# Patient Record
Sex: FEMALE | Race: WHITE | NOT HISPANIC OR LATINO | Employment: OTHER | URBAN - NONMETROPOLITAN AREA
[De-identification: names, ages, dates, MRNs, and addresses within clinical notes are randomized per-mention and may not be internally consistent; named-entity substitution may affect disease eponyms.]

---

## 2017-06-12 ENCOUNTER — TRANSCRIBE ORDERS (OUTPATIENT)
Dept: GENERAL RADIOLOGY | Facility: HOSPITAL | Age: 62
End: 2017-06-12

## 2017-06-12 DIAGNOSIS — Z13.9 SCREENING: ICD-10-CM

## 2017-06-12 DIAGNOSIS — M85.9 DISORDER OF BONE DENSITY AND STRUCTURE, UNSPECIFIED: Primary | ICD-10-CM

## 2017-07-17 ENCOUNTER — HOSPITAL ENCOUNTER (OUTPATIENT)
Dept: MAMMOGRAPHY | Facility: HOSPITAL | Age: 62
Discharge: HOME | End: 2017-07-17
Admitting: PHYSICIAN ASSISTANT

## 2017-07-17 ENCOUNTER — APPOINTMENT (OUTPATIENT)
Dept: BONE DENSITY | Facility: HOSPITAL | Age: 62
End: 2017-07-17

## 2017-07-17 DIAGNOSIS — M85.9 DISORDER OF BONE DENSITY AND STRUCTURE, UNSPECIFIED: ICD-10-CM

## 2017-07-17 DIAGNOSIS — Z13.9 SCREENING: ICD-10-CM

## 2017-07-17 PROCEDURE — G0202 SCR MAMMO BI INCL CAD: HCPCS

## 2017-07-17 PROCEDURE — 77063 BREAST TOMOSYNTHESIS BI: CPT

## 2017-07-17 PROCEDURE — 77080 DXA BONE DENSITY AXIAL: CPT

## 2017-07-18 ENCOUNTER — OFFICE VISIT (OUTPATIENT)
Dept: OBSTETRICS AND GYNECOLOGY | Facility: CLINIC | Age: 62
End: 2017-07-18

## 2017-07-18 VITALS
BODY MASS INDEX: 29.25 KG/M2 | DIASTOLIC BLOOD PRESSURE: 70 MMHG | WEIGHT: 182 LBS | HEIGHT: 66 IN | SYSTOLIC BLOOD PRESSURE: 132 MMHG

## 2017-07-18 DIAGNOSIS — N81.6 RECTOCELE: ICD-10-CM

## 2017-07-18 DIAGNOSIS — N95.2 POSTMENOPAUSAL ATROPHIC VAGINITIS: Primary | ICD-10-CM

## 2017-07-18 DIAGNOSIS — N81.11 MIDLINE CYSTOCELE: ICD-10-CM

## 2017-07-18 PROCEDURE — 99204 OFFICE O/P NEW MOD 45 MIN: CPT | Performed by: OBSTETRICS & GYNECOLOGY

## 2017-07-18 RX ORDER — PRAVASTATIN SODIUM 20 MG
20 TABLET ORAL NIGHTLY
Refills: 3 | COMMUNITY
Start: 2017-05-30

## 2017-07-18 RX ORDER — LEVOTHYROXINE SODIUM 0.07 MG/1
75 TABLET ORAL DAILY
Refills: 3 | COMMUNITY
Start: 2017-06-21

## 2017-07-18 RX ORDER — ALENDRONATE SODIUM 70 MG/1
TABLET ORAL
Refills: 3 | COMMUNITY
Start: 2017-06-21 | End: 2021-07-05

## 2017-07-18 RX ORDER — MINOCYCLINE HYDROCHLORIDE 50 MG/1
CAPSULE ORAL
Refills: 3 | COMMUNITY
Start: 2017-05-30 | End: 2021-07-05

## 2017-07-18 RX ORDER — CLINDAMYCIN PHOSPHATE 11.9 MG/ML
1 SOLUTION TOPICAL 2 TIMES DAILY PRN
Refills: 3 | Status: ON HOLD | COMMUNITY
Start: 2017-05-16 | End: 2021-09-02

## 2017-07-18 RX ORDER — ESTRADIOL 0.1 MG/G
2 CREAM VAGINAL DAILY
COMMUNITY
End: 2021-07-05

## 2017-07-19 NOTE — PROGRESS NOTES
Subjective  Chief Complaint   Patient presents with   • VAGINAL GROWTH     Patient advised has noticed growth inside vaginal opening since beginning of summer. Seen PCP and was started on vagnial estrogen.      Patient is 61 y.o.  here for evaluation of mass per vagina.  Pt gives history of severe cough in December.  Pt reports noticing around this time a mass at the opening of the vagina.  Pt reports having pressure and discomfort.  Pt did not palpate the mass until this summer and reports feeling a nodule at opening of vagina.  Pt reports seeing primary care.  Pt did not have examination at that time but patient given rx for topical estrogen.  Pt does report having vaginal dryness and irritation as well.  Pt reports then seeing a gyn in [place].  Pt unsure regarding findings but patient told to continue vaginal estrogen cream.  Pt reports being given samples but rx too expensive.  Pt concerned regarding cancer.  Pt has had previous hysterectomy with removal of ovaries in .  Pt had previous fibroids at that time.  Pt with no family history of gyn malignancy.  Pt had colonoscopy last year and reports normal.  Pt does have constipation but denies any splinting with defecation.  Pt does have problems with occ urinary urgency but denies any leaking of urine or hesitancy.    History  Past Medical History:   Diagnosis Date   • Anemia    • Arthritis    • H/O bone density study 2017   • H/O mammogram 2017   • History of blood transfusion    • History of Papanicolaou smear of cervix 2017    normal    • Thyroid disease      No current outpatient prescriptions on file prior to visit.     No current facility-administered medications on file prior to visit.      No Known Allergies  Past Surgical History:   Procedure Laterality Date   • BILATERAL SALPINGO OOPHORECTOMY  2007    [facility]   • BREAST BIOPSY      LEFT-BENIGN   • CARPAL TUNNEL RELEASE     •  SECTION     • COLONOSCOPY   "06/2016   • HYSTERECTOMY  06/07/2007    [facility] ([facility])     Family History   Problem Relation Age of Onset   • Diabetes Father    • Coronary artery disease Father    • Hypertension Father    • Osteoporosis Mother    • Hypertension Mother      Social History     Social History   • Marital status:      Spouse name: N/A   • Number of children: N/A   • Years of education: N/A     Social History Main Topics   • Smoking status: Never Smoker   • Smokeless tobacco: Never Used   • Alcohol use None   • Drug use: No   • Sexual activity: No     Other Topics Concern   • None     Social History Narrative     Review of Systems  All systems were reviewed and negative except for:  Gastrointestinal: postitive for  constipation  Genitourinary: postivie for  See HPI     Objective  Vitals:    07/18/17 1410   BP: 132/70   Weight: 182 lb (82.6 kg)   Height: 65.5\" (166.4 cm)     Physical Exam:  General Appearance: alert, appears stated age and cooperative  Head: normocephalic, without obvious abnormality and atraumatic  Eyes: lids and lashes normal, conjunctivae and sclerae normal, no icterus, no pallor and corneas clear  Neck: suppple, trachea midline and no thyromegaly  Lungs: clear to auscultation, respirations regular, respirations even and respirations unlabored  Heart: regular rhythm and normal rate, normal S1, S2, no murmur, gallop, or rubs and no click  Abdomen: normal bowel sounds, no masses, no hepatomegaly, no splenomegaly, soft non-tender, no guarding and no rebound tenderness  Pelvic: Clinical staff was present for exam  External genitalia:  normal appearance of the external genitalia including Bartholin's and [place]'s glands.  :  urethral meatus normal; urethral hypermobility is absent.  Vaginal:  atrophic mucosal changes are present;  Cervix:  absent.  Uterus:  absent.  Adnexa:  normal bimanual exam of the adnexa.  Cystocele GRADE 1  Rectocele GRADE 1  Extremities: moves extremities well, no edema, no cyanosis and no " redness  Skin: no bleeding, bruising or rash and no lesions noted  Psych: normal mood and affect, oriented to person, time and place, thought content organized and appropriate judgment    Lab Review   No data reviewed    Imaging   No data reviewed    Assessment/Plan    Problem List Items Addressed This Visit     None      Visit Diagnoses     Postmenopausal atrophic vaginitis    -  Primary  Patient with atrophic changes; estrace too expensive.  Rx Premarin cream given.  Instructions and precautions given.    Midline cystocele      Pt with pelvic organ prolapse.  Risks factors discussed including increasing parity, advancing age, obesity, history of hysterectomy, and chronic constipation.  Signs and symptoms discussed.  Treatment options discussed as well including expectant management, pelvic floor muscle exercises, pessary, medication, and surgery.  Changes in lifestyle discussed including no heavy lifting or straining, keep stools soft, and avoid increasing pressure in the area of prolapse.  Pelvic floor exercises were also discussed and reviewed.  Benefits and complications of wearing a pessary was discussed.  Estrogen therapy to strengthen the supporting structures and possibly ameliorate symptoms was discussed.  Surgical intervention with risks, complications, and benefits was also discussed.  Pt to return if desires any further intervention.  Patient reassured regarding findings.  No evidence of malignancy noted.    Relevant Medications    estradiol (ESTRACE) 0.1 MG/GM vaginal cream    conjugated estrogens (PREMARIN) 0.625 MG/GM vaginal cream    Rectocele      As noted above.  Pt to cont stool softners for constipation as well.          Follow up prn    This note was electronically signed.  Brissa Mello M.D.

## 2018-06-08 ENCOUNTER — TRANSCRIBE ORDERS (OUTPATIENT)
Dept: OBSTETRICS AND GYNECOLOGY | Facility: CLINIC | Age: 63
End: 2018-06-08

## 2018-06-08 DIAGNOSIS — Z12.39 SCREENING BREAST EXAMINATION: Primary | ICD-10-CM

## 2018-07-19 ENCOUNTER — HOSPITAL ENCOUNTER (OUTPATIENT)
Dept: MAMMOGRAPHY | Facility: HOSPITAL | Age: 63
Discharge: HOME | End: 2018-07-19
Attending: OBSTETRICS & GYNECOLOGY | Admitting: OBSTETRICS & GYNECOLOGY

## 2018-07-19 DIAGNOSIS — Z12.39 SCREENING BREAST EXAMINATION: ICD-10-CM

## 2018-07-19 PROCEDURE — 77063 BREAST TOMOSYNTHESIS BI: CPT

## 2018-07-19 PROCEDURE — 77067 SCR MAMMO BI INCL CAD: CPT

## 2019-06-07 ENCOUNTER — TRANSCRIBE ORDERS (OUTPATIENT)
Dept: ADMINISTRATIVE | Facility: HOSPITAL | Age: 64
End: 2019-06-07

## 2019-06-07 ENCOUNTER — TRANSCRIBE ORDERS (OUTPATIENT)
Dept: MAMMOGRAPHY | Facility: HOSPITAL | Age: 64
End: 2019-06-07

## 2019-06-07 DIAGNOSIS — Z12.39 BREAST CANCER SCREENING: Primary | ICD-10-CM

## 2019-06-07 DIAGNOSIS — M85.9 DISORDER OF BONE DENSITY AND STRUCTURE, UNSPECIFIED: Primary | ICD-10-CM

## 2019-06-10 ENCOUNTER — APPOINTMENT (OUTPATIENT)
Dept: BONE DENSITY | Facility: HOSPITAL | Age: 64
End: 2019-06-10

## 2019-06-10 DIAGNOSIS — M85.9 DISORDER OF BONE DENSITY AND STRUCTURE, UNSPECIFIED: ICD-10-CM

## 2019-06-10 PROCEDURE — 77080 DXA BONE DENSITY AXIAL: CPT

## 2019-08-02 ENCOUNTER — HOSPITAL ENCOUNTER (OUTPATIENT)
Dept: MAMMOGRAPHY | Facility: HOSPITAL | Age: 64
Discharge: HOME | End: 2019-08-02
Admitting: INTERNAL MEDICINE

## 2019-08-02 DIAGNOSIS — Z12.39 BREAST CANCER SCREENING: ICD-10-CM

## 2019-08-02 PROCEDURE — 77067 SCR MAMMO BI INCL CAD: CPT

## 2019-08-02 PROCEDURE — 77063 BREAST TOMOSYNTHESIS BI: CPT

## 2020-06-23 ENCOUNTER — TRANSCRIBE ORDERS (OUTPATIENT)
Dept: ADMINISTRATIVE | Facility: HOSPITAL | Age: 65
End: 2020-06-23

## 2020-06-23 DIAGNOSIS — Z12.31 VISIT FOR SCREENING MAMMOGRAM: Primary | ICD-10-CM

## 2020-08-03 ENCOUNTER — HOSPITAL ENCOUNTER (OUTPATIENT)
Dept: MAMMOGRAPHY | Facility: HOSPITAL | Age: 65
Discharge: HOME | End: 2020-08-03
Admitting: OBSTETRICS & GYNECOLOGY

## 2020-08-03 DIAGNOSIS — Z12.31 VISIT FOR SCREENING MAMMOGRAM: ICD-10-CM

## 2020-08-03 PROCEDURE — 77067 SCR MAMMO BI INCL CAD: CPT

## 2020-08-03 PROCEDURE — 77063 BREAST TOMOSYNTHESIS BI: CPT

## 2021-06-17 ENCOUNTER — TRANSCRIBE ORDERS (OUTPATIENT)
Dept: ADMINISTRATIVE | Facility: HOSPITAL | Age: 66
End: 2021-06-17

## 2021-06-17 DIAGNOSIS — Z12.31 VISIT FOR SCREENING MAMMOGRAM: Primary | ICD-10-CM

## 2021-07-05 ENCOUNTER — PRE-ADMISSION TESTING (OUTPATIENT)
Dept: PREADMISSION TESTING | Facility: HOSPITAL | Age: 66
End: 2021-07-05

## 2021-07-05 VITALS — HEIGHT: 65 IN | BODY MASS INDEX: 25.01 KG/M2 | WEIGHT: 150.13 LBS

## 2021-07-05 LAB
ABO GROUP BLD: NORMAL
ANION GAP SERPL CALCULATED.3IONS-SCNC: 8 MMOL/L (ref 5–15)
BLD GP AB SCN SERPL QL: NEGATIVE
BUN SERPL-MCNC: 21 MG/DL (ref 8–23)
BUN/CREAT SERPL: 30.4 (ref 7–25)
CALCIUM SPEC-SCNC: 9.6 MG/DL (ref 8.6–10.5)
CHLORIDE SERPL-SCNC: 103 MMOL/L (ref 98–107)
CO2 SERPL-SCNC: 28 MMOL/L (ref 22–29)
CREAT SERPL-MCNC: 0.69 MG/DL (ref 0.57–1)
DEPRECATED RDW RBC AUTO: 44.9 FL (ref 37–54)
ERYTHROCYTE [DISTWIDTH] IN BLOOD BY AUTOMATED COUNT: 13.2 % (ref 12.3–15.4)
GFR SERPL CREATININE-BSD FRML MDRD: 85 ML/MIN/1.73
GLUCOSE SERPL-MCNC: 85 MG/DL (ref 65–99)
HCT VFR BLD AUTO: 41.6 % (ref 34–46.6)
HGB BLD-MCNC: 13.2 G/DL (ref 12–15.9)
MCH RBC QN AUTO: 28.9 PG (ref 26.6–33)
MCHC RBC AUTO-ENTMCNC: 31.7 G/DL (ref 31.5–35.7)
MCV RBC AUTO: 91.2 FL (ref 79–97)
PLATELET # BLD AUTO: 210 10*3/MM3 (ref 140–450)
PMV BLD AUTO: 11.6 FL (ref 6–12)
POTASSIUM SERPL-SCNC: 4.5 MMOL/L (ref 3.5–5.2)
RBC # BLD AUTO: 4.56 10*6/MM3 (ref 3.77–5.28)
RH BLD: POSITIVE
SARS-COV-2 RNA PNL SPEC NAA+PROBE: NOT DETECTED
SODIUM SERPL-SCNC: 139 MMOL/L (ref 136–145)
T&S EXPIRATION DATE: NORMAL
WBC # BLD AUTO: 6.26 10*3/MM3 (ref 3.4–10.8)

## 2021-07-05 PROCEDURE — 86901 BLOOD TYPING SEROLOGIC RH(D): CPT

## 2021-07-05 PROCEDURE — 80048 BASIC METABOLIC PNL TOTAL CA: CPT

## 2021-07-05 PROCEDURE — U0004 COV-19 TEST NON-CDC HGH THRU: HCPCS

## 2021-07-05 PROCEDURE — 36415 COLL VENOUS BLD VENIPUNCTURE: CPT

## 2021-07-05 PROCEDURE — 86900 BLOOD TYPING SEROLOGIC ABO: CPT

## 2021-07-05 PROCEDURE — 93005 ELECTROCARDIOGRAM TRACING: CPT

## 2021-07-05 PROCEDURE — 85027 COMPLETE CBC AUTOMATED: CPT

## 2021-07-05 PROCEDURE — C9803 HOPD COVID-19 SPEC COLLECT: HCPCS

## 2021-07-05 PROCEDURE — 93010 ELECTROCARDIOGRAM REPORT: CPT | Performed by: INTERNAL MEDICINE

## 2021-07-05 PROCEDURE — 86850 RBC ANTIBODY SCREEN: CPT

## 2021-07-05 RX ORDER — MULTIPLE VITAMINS W/ MINERALS TAB 9MG-400MCG
1 TAB ORAL DAILY
Status: ON HOLD | COMMUNITY
End: 2021-09-02

## 2021-07-05 RX ORDER — SODIUM PHOSPHATE,MONO-DIBASIC 19G-7G/118
1 ENEMA (ML) RECTAL DAILY
Status: ON HOLD | COMMUNITY
End: 2021-07-07

## 2021-07-05 RX ORDER — MULTIPLE VITAMINS W/ MINERALS TAB 9MG-400MCG
1 TAB ORAL DAILY
COMMUNITY

## 2021-07-05 NOTE — PAT
An arrival time for procedure was not given during PAT visit. If patient had any questions or concerns about their arrival time, they were instructed to contact their surgeon/physician.  Additionally, if the patient referred to an arrival time that was acquired from their my chart account, patient was encouraged to verify that time with their surgeon/physician.  NO arrival times given in Pre Admission Testing Department.    Patient to apply Chlorhexadine wipes  to surgical area (as instructed) the night before procedure and the AM of procedure. Wipes provided.

## 2021-07-06 ENCOUNTER — ANESTHESIA EVENT (OUTPATIENT)
Dept: PERIOP | Facility: HOSPITAL | Age: 66
End: 2021-07-06

## 2021-07-06 LAB
QT INTERVAL: 394 MS
QTC INTERVAL: 394 MS

## 2021-07-06 RX ORDER — SODIUM CHLORIDE 0.9 % (FLUSH) 0.9 %
10 SYRINGE (ML) INJECTION EVERY 12 HOURS SCHEDULED
Status: CANCELLED | OUTPATIENT
Start: 2021-07-06

## 2021-07-06 RX ORDER — SODIUM CHLORIDE 0.9 % (FLUSH) 0.9 %
10 SYRINGE (ML) INJECTION AS NEEDED
Status: CANCELLED | OUTPATIENT
Start: 2021-07-06

## 2021-07-06 RX ORDER — FAMOTIDINE 10 MG/ML
20 INJECTION, SOLUTION INTRAVENOUS ONCE
Status: CANCELLED | OUTPATIENT
Start: 2021-07-06 | End: 2021-07-06

## 2021-07-07 ENCOUNTER — ANESTHESIA (OUTPATIENT)
Dept: PERIOP | Facility: HOSPITAL | Age: 66
End: 2021-07-07

## 2021-07-07 ENCOUNTER — HOSPITAL ENCOUNTER (OUTPATIENT)
Facility: HOSPITAL | Age: 66
Discharge: HOME | End: 2021-07-08
Attending: OBSTETRICS & GYNECOLOGY | Admitting: OBSTETRICS & GYNECOLOGY

## 2021-07-07 PROBLEM — N81.10 PELVIC RELAXATION DUE TO VAGINAL PROLAPSE: Status: ACTIVE | Noted: 2021-07-07

## 2021-07-07 PROCEDURE — 57425 LAPAROSCOPY SURG COLPOPEXY: CPT | Performed by: PHYSICIAN ASSISTANT

## 2021-07-07 PROCEDURE — 25010000002 ONDANSETRON PER 1 MG: Performed by: NURSE ANESTHETIST, CERTIFIED REGISTERED

## 2021-07-07 PROCEDURE — 25010000003 LIDOCAINE 1 % SOLUTION: Performed by: NURSE ANESTHETIST, CERTIFIED REGISTERED

## 2021-07-07 PROCEDURE — 25010000002 NEOSTIGMINE 10 MG/10ML SOLUTION: Performed by: NURSE ANESTHETIST, CERTIFIED REGISTERED

## 2021-07-07 PROCEDURE — 25010000002 KETOROLAC TROMETHAMINE PER 15 MG: Performed by: OBSTETRICS & GYNECOLOGY

## 2021-07-07 PROCEDURE — 25010000002 CEFAZOLIN PER 500 MG: Performed by: OBSTETRICS & GYNECOLOGY

## 2021-07-07 PROCEDURE — 25010000003 CEFAZOLIN IN DEXTROSE 2-4 GM/100ML-% SOLUTION: Performed by: OBSTETRICS & GYNECOLOGY

## 2021-07-07 PROCEDURE — C1781 MESH (IMPLANTABLE): HCPCS | Performed by: OBSTETRICS & GYNECOLOGY

## 2021-07-07 PROCEDURE — 25010000002 HEPARIN (PORCINE) PER 1000 UNITS: Performed by: OBSTETRICS & GYNECOLOGY

## 2021-07-07 PROCEDURE — 25010000002 DEXAMETHASONE PER 1 MG: Performed by: NURSE ANESTHETIST, CERTIFIED REGISTERED

## 2021-07-07 PROCEDURE — 25010000002 PROPOFOL 10 MG/ML EMULSION: Performed by: NURSE ANESTHETIST, CERTIFIED REGISTERED

## 2021-07-07 PROCEDURE — 25010000002 FENTANYL CITRATE (PF) 50 MCG/ML SOLUTION: Performed by: NURSE ANESTHETIST, CERTIFIED REGISTERED

## 2021-07-07 DEVICE — MESH ARTISYN Y SHP: Type: IMPLANTABLE DEVICE | Site: PELVIS | Status: FUNCTIONAL

## 2021-07-07 RX ORDER — MIDAZOLAM HYDROCHLORIDE 1 MG/ML
0.5 INJECTION INTRAMUSCULAR; INTRAVENOUS
Status: DISCONTINUED | OUTPATIENT
Start: 2021-07-07 | End: 2021-07-07 | Stop reason: HOSPADM

## 2021-07-07 RX ORDER — ONDANSETRON 4 MG/1
4 TABLET, FILM COATED ORAL EVERY 6 HOURS PRN
Status: DISCONTINUED | OUTPATIENT
Start: 2021-07-07 | End: 2021-07-08 | Stop reason: HOSPADM

## 2021-07-07 RX ORDER — LIDOCAINE HYDROCHLORIDE 10 MG/ML
0.5 INJECTION, SOLUTION EPIDURAL; INFILTRATION; INTRACAUDAL; PERINEURAL ONCE AS NEEDED
Status: COMPLETED | OUTPATIENT
Start: 2021-07-07 | End: 2021-07-07

## 2021-07-07 RX ORDER — POLYETHYLENE GLYCOL 3350 17 G/17G
17 POWDER, FOR SOLUTION ORAL DAILY PRN
Status: DISCONTINUED | OUTPATIENT
Start: 2021-07-07 | End: 2021-07-08 | Stop reason: HOSPADM

## 2021-07-07 RX ORDER — SODIUM CHLORIDE, SODIUM LACTATE, POTASSIUM CHLORIDE, CALCIUM CHLORIDE 600; 310; 30; 20 MG/100ML; MG/100ML; MG/100ML; MG/100ML
9 INJECTION, SOLUTION INTRAVENOUS CONTINUOUS
Status: DISCONTINUED | OUTPATIENT
Start: 2021-07-07 | End: 2021-07-08 | Stop reason: HOSPADM

## 2021-07-07 RX ORDER — KETOROLAC TROMETHAMINE 15 MG/ML
15 INJECTION, SOLUTION INTRAMUSCULAR; INTRAVENOUS EVERY 6 HOURS
Status: COMPLETED | OUTPATIENT
Start: 2021-07-07 | End: 2021-07-07

## 2021-07-07 RX ORDER — HYDROMORPHONE HYDROCHLORIDE 1 MG/ML
0.5 INJECTION, SOLUTION INTRAMUSCULAR; INTRAVENOUS; SUBCUTANEOUS
Status: DISCONTINUED | OUTPATIENT
Start: 2021-07-07 | End: 2021-07-07 | Stop reason: HOSPADM

## 2021-07-07 RX ORDER — HYDROCODONE BITARTRATE AND ACETAMINOPHEN 5; 325 MG/1; MG/1
1 TABLET ORAL EVERY 4 HOURS PRN
Status: DISCONTINUED | OUTPATIENT
Start: 2021-07-07 | End: 2021-07-08 | Stop reason: HOSPADM

## 2021-07-07 RX ORDER — LIDOCAINE HYDROCHLORIDE 10 MG/ML
INJECTION, SOLUTION INFILTRATION; PERINEURAL AS NEEDED
Status: DISCONTINUED | OUTPATIENT
Start: 2021-07-07 | End: 2021-07-07 | Stop reason: SURG

## 2021-07-07 RX ORDER — DEXAMETHASONE SODIUM PHOSPHATE 4 MG/ML
INJECTION, SOLUTION INTRA-ARTICULAR; INTRALESIONAL; INTRAMUSCULAR; INTRAVENOUS; SOFT TISSUE AS NEEDED
Status: DISCONTINUED | OUTPATIENT
Start: 2021-07-07 | End: 2021-07-07 | Stop reason: SURG

## 2021-07-07 RX ORDER — LIDOCAINE 50 MG/G
OINTMENT TOPICAL AS NEEDED
Status: DISCONTINUED | OUTPATIENT
Start: 2021-07-07 | End: 2021-07-07 | Stop reason: SURG

## 2021-07-07 RX ORDER — NEOSTIGMINE METHYLSULFATE 1 MG/ML
INJECTION, SOLUTION INTRAVENOUS AS NEEDED
Status: DISCONTINUED | OUTPATIENT
Start: 2021-07-07 | End: 2021-07-07 | Stop reason: SURG

## 2021-07-07 RX ORDER — ROCURONIUM BROMIDE 10 MG/ML
INJECTION, SOLUTION INTRAVENOUS AS NEEDED
Status: DISCONTINUED | OUTPATIENT
Start: 2021-07-07 | End: 2021-07-07 | Stop reason: SURG

## 2021-07-07 RX ORDER — ACETAMINOPHEN 650 MG/1
650 SUPPOSITORY RECTAL EVERY 4 HOURS PRN
Status: DISCONTINUED | OUTPATIENT
Start: 2021-07-07 | End: 2021-07-08 | Stop reason: HOSPADM

## 2021-07-07 RX ORDER — ONDANSETRON 2 MG/ML
4 INJECTION INTRAMUSCULAR; INTRAVENOUS EVERY 6 HOURS PRN
Status: DISCONTINUED | OUTPATIENT
Start: 2021-07-07 | End: 2021-07-08 | Stop reason: HOSPADM

## 2021-07-07 RX ORDER — CEFAZOLIN SODIUM 2 G/100ML
2 INJECTION, SOLUTION INTRAVENOUS EVERY 8 HOURS
Status: COMPLETED | OUTPATIENT
Start: 2021-07-07 | End: 2021-07-08

## 2021-07-07 RX ORDER — ACETAMINOPHEN 160 MG/5ML
650 SOLUTION ORAL EVERY 4 HOURS PRN
Status: DISCONTINUED | OUTPATIENT
Start: 2021-07-07 | End: 2021-07-08 | Stop reason: HOSPADM

## 2021-07-07 RX ORDER — ACETAMINOPHEN 325 MG/1
650 TABLET ORAL EVERY 4 HOURS PRN
Status: DISCONTINUED | OUTPATIENT
Start: 2021-07-07 | End: 2021-07-08 | Stop reason: HOSPADM

## 2021-07-07 RX ORDER — BIOTIN 1 MG
1000 TABLET ORAL DAILY
COMMUNITY

## 2021-07-07 RX ORDER — PHENAZOPYRIDINE HYDROCHLORIDE 100 MG/1
200 TABLET, FILM COATED ORAL ONCE
Status: COMPLETED | OUTPATIENT
Start: 2021-07-07 | End: 2021-07-07

## 2021-07-07 RX ORDER — LEVOTHYROXINE SODIUM 0.07 MG/1
75 TABLET ORAL
Status: DISCONTINUED | OUTPATIENT
Start: 2021-07-08 | End: 2021-07-08 | Stop reason: HOSPADM

## 2021-07-07 RX ORDER — FAMOTIDINE 20 MG/1
20 TABLET, FILM COATED ORAL ONCE
Status: COMPLETED | OUTPATIENT
Start: 2021-07-07 | End: 2021-07-07

## 2021-07-07 RX ORDER — ONDANSETRON 2 MG/ML
4 INJECTION INTRAMUSCULAR; INTRAVENOUS ONCE AS NEEDED
Status: DISCONTINUED | OUTPATIENT
Start: 2021-07-07 | End: 2021-07-07 | Stop reason: HOSPADM

## 2021-07-07 RX ORDER — GLYCOPYRROLATE 0.2 MG/ML
INJECTION INTRAMUSCULAR; INTRAVENOUS AS NEEDED
Status: DISCONTINUED | OUTPATIENT
Start: 2021-07-07 | End: 2021-07-07 | Stop reason: SURG

## 2021-07-07 RX ORDER — MORPHINE SULFATE 2 MG/ML
1 INJECTION, SOLUTION INTRAMUSCULAR; INTRAVENOUS EVERY 4 HOURS PRN
Status: DISCONTINUED | OUTPATIENT
Start: 2021-07-07 | End: 2021-07-08 | Stop reason: HOSPADM

## 2021-07-07 RX ORDER — SODIUM CHLORIDE, SODIUM LACTATE, POTASSIUM CHLORIDE, CALCIUM CHLORIDE 600; 310; 30; 20 MG/100ML; MG/100ML; MG/100ML; MG/100ML
100 INJECTION, SOLUTION INTRAVENOUS CONTINUOUS
Status: DISCONTINUED | OUTPATIENT
Start: 2021-07-07 | End: 2021-07-08 | Stop reason: HOSPADM

## 2021-07-07 RX ORDER — PROMETHAZINE HYDROCHLORIDE 25 MG/1
25 TABLET ORAL ONCE AS NEEDED
Status: DISCONTINUED | OUTPATIENT
Start: 2021-07-07 | End: 2021-07-07 | Stop reason: HOSPADM

## 2021-07-07 RX ORDER — HEPARIN SODIUM 5000 [USP'U]/ML
5000 INJECTION, SOLUTION INTRAVENOUS; SUBCUTANEOUS ONCE
Status: COMPLETED | OUTPATIENT
Start: 2021-07-07 | End: 2021-07-07

## 2021-07-07 RX ORDER — CEFAZOLIN SODIUM 2 G/100ML
2 INJECTION, SOLUTION INTRAVENOUS ONCE
Status: COMPLETED | OUTPATIENT
Start: 2021-07-07 | End: 2021-07-07

## 2021-07-07 RX ORDER — EPHEDRINE SULFATE 50 MG/ML
INJECTION, SOLUTION INTRAVENOUS AS NEEDED
Status: DISCONTINUED | OUTPATIENT
Start: 2021-07-07 | End: 2021-07-07 | Stop reason: SURG

## 2021-07-07 RX ORDER — NALOXONE HCL 0.4 MG/ML
0.4 VIAL (ML) INJECTION
Status: DISCONTINUED | OUTPATIENT
Start: 2021-07-07 | End: 2021-07-08 | Stop reason: HOSPADM

## 2021-07-07 RX ORDER — PRAVASTATIN SODIUM 20 MG
20 TABLET ORAL NIGHTLY
Status: DISCONTINUED | OUTPATIENT
Start: 2021-07-07 | End: 2021-07-08 | Stop reason: HOSPADM

## 2021-07-07 RX ORDER — FENTANYL CITRATE 50 UG/ML
50 INJECTION, SOLUTION INTRAMUSCULAR; INTRAVENOUS
Status: DISCONTINUED | OUTPATIENT
Start: 2021-07-07 | End: 2021-07-07 | Stop reason: HOSPADM

## 2021-07-07 RX ORDER — BUPIVACAINE HYDROCHLORIDE 5 MG/ML
INJECTION, SOLUTION PERINEURAL AS NEEDED
Status: DISCONTINUED | OUTPATIENT
Start: 2021-07-07 | End: 2021-07-07 | Stop reason: HOSPADM

## 2021-07-07 RX ORDER — ACETAMINOPHEN 500 MG
1000 TABLET ORAL ONCE
Status: COMPLETED | OUTPATIENT
Start: 2021-07-07 | End: 2021-07-07

## 2021-07-07 RX ORDER — HEPARIN SODIUM 5000 [USP'U]/ML
5000 INJECTION, SOLUTION INTRAVENOUS; SUBCUTANEOUS EVERY 12 HOURS SCHEDULED
Status: DISCONTINUED | OUTPATIENT
Start: 2021-07-08 | End: 2021-07-08 | Stop reason: HOSPADM

## 2021-07-07 RX ORDER — PROPOFOL 10 MG/ML
VIAL (ML) INTRAVENOUS AS NEEDED
Status: DISCONTINUED | OUTPATIENT
Start: 2021-07-07 | End: 2021-07-07 | Stop reason: SURG

## 2021-07-07 RX ORDER — SIMETHICONE 80 MG
80 TABLET,CHEWABLE ORAL 4 TIMES DAILY PRN
Status: DISCONTINUED | OUTPATIENT
Start: 2021-07-07 | End: 2021-07-08 | Stop reason: HOSPADM

## 2021-07-07 RX ORDER — MAGNESIUM HYDROXIDE 1200 MG/15ML
LIQUID ORAL AS NEEDED
Status: DISCONTINUED | OUTPATIENT
Start: 2021-07-07 | End: 2021-07-07 | Stop reason: HOSPADM

## 2021-07-07 RX ORDER — FENTANYL CITRATE 50 UG/ML
INJECTION, SOLUTION INTRAMUSCULAR; INTRAVENOUS AS NEEDED
Status: DISCONTINUED | OUTPATIENT
Start: 2021-07-07 | End: 2021-07-07 | Stop reason: SURG

## 2021-07-07 RX ORDER — ONDANSETRON 2 MG/ML
INJECTION INTRAMUSCULAR; INTRAVENOUS AS NEEDED
Status: DISCONTINUED | OUTPATIENT
Start: 2021-07-07 | End: 2021-07-07 | Stop reason: SURG

## 2021-07-07 RX ORDER — PROMETHAZINE HYDROCHLORIDE 25 MG/1
25 SUPPOSITORY RECTAL ONCE AS NEEDED
Status: DISCONTINUED | OUTPATIENT
Start: 2021-07-07 | End: 2021-07-07 | Stop reason: HOSPADM

## 2021-07-07 RX ORDER — SODIUM CHLORIDE 9 MG/ML
INJECTION, SOLUTION INTRAVENOUS AS NEEDED
Status: DISCONTINUED | OUTPATIENT
Start: 2021-07-07 | End: 2021-07-07 | Stop reason: HOSPADM

## 2021-07-07 RX ADMIN — SODIUM CHLORIDE, POTASSIUM CHLORIDE, SODIUM LACTATE AND CALCIUM CHLORIDE 9 ML/HR: 600; 310; 30; 20 INJECTION, SOLUTION INTRAVENOUS at 06:09

## 2021-07-07 RX ADMIN — LIDOCAINE HYDROCHLORIDE 0.5 ML: 10 INJECTION, SOLUTION EPIDURAL; INFILTRATION; INTRACAUDAL; PERINEURAL at 06:09

## 2021-07-07 RX ADMIN — ACETAMINOPHEN 1000 MG: 500 TABLET, FILM COATED ORAL at 06:28

## 2021-07-07 RX ADMIN — ONDANSETRON 4 MG: 2 INJECTION INTRAMUSCULAR; INTRAVENOUS at 09:57

## 2021-07-07 RX ADMIN — CEFAZOLIN 2 G: 10 INJECTION, POWDER, FOR SOLUTION INTRAVENOUS at 23:25

## 2021-07-07 RX ADMIN — ROCURONIUM BROMIDE 10 MG: 10 INJECTION, SOLUTION INTRAVENOUS at 09:18

## 2021-07-07 RX ADMIN — KETOROLAC TROMETHAMINE 15 MG: 15 INJECTION, SOLUTION INTRAMUSCULAR; INTRAVENOUS at 18:13

## 2021-07-07 RX ADMIN — PROPOFOL 25 MCG/KG/MIN: 10 INJECTION, EMULSION INTRAVENOUS at 07:45

## 2021-07-07 RX ADMIN — GLYCOPYRROLATE 0.1 MG: 0.4 INJECTION INTRAMUSCULAR; INTRAVENOUS at 07:58

## 2021-07-07 RX ADMIN — CEFAZOLIN 2 G: 10 INJECTION, POWDER, FOR SOLUTION INTRAVENOUS at 15:53

## 2021-07-07 RX ADMIN — LIDOCAINE HYDROCHLORIDE 50 MG: 10 INJECTION, SOLUTION INFILTRATION; PERINEURAL at 07:37

## 2021-07-07 RX ADMIN — ROCURONIUM BROMIDE 40 MG: 10 INJECTION, SOLUTION INTRAVENOUS at 07:37

## 2021-07-07 RX ADMIN — NEOSTIGMINE 3 MG: 1 INJECTION INTRAVENOUS at 10:11

## 2021-07-07 RX ADMIN — EPHEDRINE SULFATE 5 MG: 50 INJECTION INTRAVENOUS at 07:52

## 2021-07-07 RX ADMIN — HYDROCODONE BITARTRATE AND ACETAMINOPHEN 1 TABLET: 5; 325 TABLET ORAL at 12:53

## 2021-07-07 RX ADMIN — PROPOFOL 150 MG: 10 INJECTION, EMULSION INTRAVENOUS at 07:37

## 2021-07-07 RX ADMIN — SODIUM CHLORIDE, POTASSIUM CHLORIDE, SODIUM LACTATE AND CALCIUM CHLORIDE: 600; 310; 30; 20 INJECTION, SOLUTION INTRAVENOUS at 09:53

## 2021-07-07 RX ADMIN — PHENAZOPYRIDINE 200 MG: 100 TABLET ORAL at 06:28

## 2021-07-07 RX ADMIN — PRAVASTATIN SODIUM 20 MG: 20 TABLET ORAL at 20:47

## 2021-07-07 RX ADMIN — SODIUM CHLORIDE, POTASSIUM CHLORIDE, SODIUM LACTATE AND CALCIUM CHLORIDE 100 ML/HR: 600; 310; 30; 20 INJECTION, SOLUTION INTRAVENOUS at 12:32

## 2021-07-07 RX ADMIN — KETOROLAC TROMETHAMINE 15 MG: 15 INJECTION, SOLUTION INTRAMUSCULAR; INTRAVENOUS at 12:52

## 2021-07-07 RX ADMIN — FENTANYL CITRATE 50 MCG: 50 INJECTION, SOLUTION INTRAMUSCULAR; INTRAVENOUS at 07:32

## 2021-07-07 RX ADMIN — DEXAMETHASONE SODIUM PHOSPHATE 8 MG: 4 INJECTION, SOLUTION INTRA-ARTICULAR; INTRALESIONAL; INTRAMUSCULAR; INTRAVENOUS; SOFT TISSUE at 07:53

## 2021-07-07 RX ADMIN — FENTANYL CITRATE 50 MCG: 50 INJECTION, SOLUTION INTRAMUSCULAR; INTRAVENOUS at 10:13

## 2021-07-07 RX ADMIN — SERTRALINE HYDROCHLORIDE 50 MG: 50 TABLET ORAL at 12:53

## 2021-07-07 RX ADMIN — LIDOCAINE 1 APPLICATION: 50 OINTMENT TOPICAL at 07:39

## 2021-07-07 RX ADMIN — FAMOTIDINE 20 MG: 20 TABLET ORAL at 06:28

## 2021-07-07 RX ADMIN — GLYCOPYRROLATE 0.4 MG: 0.4 INJECTION INTRAMUSCULAR; INTRAVENOUS at 10:11

## 2021-07-07 RX ADMIN — CEFAZOLIN SODIUM 2 G: 10 INJECTION, POWDER, FOR SOLUTION INTRAVENOUS at 07:31

## 2021-07-07 NOTE — PLAN OF CARE
Goal Outcome Evaluation:           Progress: improving     VSS. Saldana in place which is to d/c at 0600 tomorrow. Pain controlled with PO medication. Tolerating PO intake without c/o n/v. Scuds on. Lap sites dry, intact, with dried drainage.

## 2021-07-07 NOTE — H&P
Patient Care Team:  Ashley Grier MD as PCP - General (Internal Medicine)  Brissa Bowers MD as Consulting Physician (Obstetrics and Gynecology)  BeBatool torrez MD as Consulting Physician (Obstetrics and Gynecology)    Chief complaint  Vaginal prolapse.  Now falling out around the pessary    Subjective     Patient is a 65 y.o. female presents with complete vaginal prolapse.  Was using a pessary for a long time but then now is having the vagina fall out around the pessary. Very uncomfortable.  Desires surgical repair.     Review of Systems   Pertinent items are noted in HPI    History  Past Medical History:   Diagnosis Date   • Anemia    • Anxiety and depression    • Arthritis    • History of blood transfusion     AT BIRTH   • Hyperlipidemia    • Hypertension    • Murmur    • Skin cancer     SQUAMOUS CELL-NECK   • Thyroid disease      Past Surgical History:   Procedure Laterality Date   • BILATERAL SALPINGO OOPHORECTOMY  2007    [facility]   • BREAST BIOPSY      bilateral, benign   • CARPAL TUNNEL RELEASE Left    •  SECTION     • CLOSED REDUCTION ANKLE FRACTURE Left    • COLONOSCOPY  2016   • HYSTERECTOMY  2007    [facility] ([facility])     Family History   Problem Relation Age of Onset   • Diabetes Father    • Coronary artery disease Father    • Hypertension Father    • Osteoporosis Mother    • Hypertension Mother      Social History     Tobacco Use   • Smoking status: Former Smoker   • Smokeless tobacco: Never Used   • Tobacco comment: Quit in    Vaping Use   • Vaping Use: Never used   Substance Use Topics   • Alcohol use: Never   • Drug use: No     E-cigarette/Vaping   • E-cigarette/Vaping Use Never User    • Passive Exposure No      E-cigarette/Vaping Substances     Medications Prior to Admission   Medication Sig Dispense Refill Last Dose   • Biotin 1000 MCG tablet Take 1,000 mcg by mouth Daily.   2021 at 1000   • Calcium Carbonate-Vitamin D3 (Calcium 600-D) 600-400  MG-UNIT tablet Take 1 tablet by mouth Daily.   7/6/2021 at Unknown time   • Glucosamine-Chondroit-Vit C-Mn (GLUCOSAMINE CHONDR 1500 COMPLX PO) Take 1 tablet by mouth Daily.   7/6/2021 at 1000   • levothyroxine (SYNTHROID, LEVOTHROID) 75 MCG tablet Take 75 mcg by mouth Daily.  3 7/6/2021 at 1000   • multivitamin with minerals (Hair/Skin/Nails/Biotin) tablet tablet Take 1 tablet by mouth Daily.   7/6/2021 at 1000   • multivitamin with minerals (Multivitamin Adult) tablet tablet Take 1 tablet by mouth Daily.   7/6/2021 at 1000   • nystatin-triamcinolone (MYCOLOG II) 402108-1.1 UNIT/GM-% cream Apply 1 application topically to the appropriate area as directed Daily As Needed.   7/6/2021 at 1000   • pravastatin (PRAVACHOL) 20 MG tablet Take 20 mg by mouth Every Night.  3 7/6/2021 at 2300   • sertraline (ZOLOFT) 50 MG tablet Take 50 mg by mouth Daily.  3 7/6/2021 at 1000   • clindamycin (CLEOCIN T) 1 % external solution Apply 1 application topically to the appropriate area as directed 2 (Two) Times a Day As Needed.  3 6/30/2021   • conjugated estrogens (PREMARIN) 0.625 MG/GM vaginal cream Use 0.5 - 2.0 grams intravaginally 1-3 times per week to control symptoms. (Patient taking differently: Insert  into the vagina Daily. Use 0.5 - 2.0 grams intravaginally 1-3 times per week to control symptoms.) 1 each 12 7/4/2021     Allergies:  No Known Allergies    Objective     Vital Signs  Temp:  [97.8 °F (36.6 °C)] 97.8 °F (36.6 °C)  Heart Rate:  [64] 64  Resp:  [16] 16  BP: (149)/(81) 149/81    Physical Exam:      General Appearance:    Alert, cooperative, in no acute distress   Head:    Normocephalic, without obvious abnormality, atraumatic   Eyes:            Lids and lashes normal, conjunctivae and sclerae normal, no   icterus, no pallor, corneas clear, PERRLA   Ears:    Ears appear intact with no abnormalities noted   Throat:   No oral lesions, no thrush, oral mucosa moist   Neck:   No adenopathy, supple, trachea midline, no  thyromegaly, no     carotid bruit, no JVD   Back:     No kyphosis present, no scoliosis present, no skin lesions,       erythema or scars, no tenderness to percussion or                   palpation,   range of motion normal   Lungs:     Clear to auscultation,respirations regular, even and                   unlabored    Heart:    Regular rhythm and normal rate, normal S1 and S2, no            murmur, no gallop, no rub, no click   Breast Exam:    Deferred   Abdomen:     Normal bowel sounds, no masses, no organomegaly, soft        non-tender, non-distended, no guarding, no rebound                 tenderness   Genitalia:    Deferred   Extremities:   Moves all extremities well, no edema, no cyanosis, no              redness   Pulses:   Pulses palpable and equal bilaterally   Skin:   No bleeding, bruising or rash   Lymph nodes:   No palpable adenopathy   Neurologic:   Cranial nerves 2 - 12 grossly intact, sensation intact, DTR        present and equal bilaterally       Results Review:    I reviewed the patient's new clinical results.    Assessment/Plan       * No active hospital problems. *      Vaginal prolpase. Prior hysterectomy and BSO .  Long term pessary use but now prolapse is returning even around the pessary.  Pt desires surgical repair.  R/b/a to surgery given, postop findings and recovery expectations discussed. Pt has all her postop meds.  Staying overnight.  Nkda.  Midline abdominal incision from c/s.

## 2021-07-07 NOTE — OP NOTE
GYN Operative Note    Patient Name, age and sex:  Andie Monreal, 65 y.o., female  Procedure Date:  7/7/2021    Surgeon(s) and Role:     * Batool Larios MD - Primary    Additional Staff: Yvan Melendez  Medically necessary for assistance.Complex retraction. Suturing skills.Complex and critical patient postioning.  Laparoscopic instrument use and manipulation.     * No Diagnosis Codes entered *    * No Diagnosis Codes entered *    Procedure(s):  SACROCOLPOPEXY ROBOTIC WITH MESH INSERTION CYSTOSCOPY    Indications for Operation: Complete vaginal prolapse and failed pessary to maintain control of prolapse.  Patient has had prior hysterectomy and BSO.  Desired surgical repair and the best chances for long-term durable results and being able to maintain sexual function is the robotic sacrocolpopexy.  Response alternatives were discussed patient agrees consents procedure and the risk thereof.    Antibiotics:  cefazolin (Ancef) ordered on call to OR    Anesthesia:  Type: General -   ASA:  II    Operative Findings: The apex of the vagina is prolapsed past the hymenal ring with a significant amount of anterior vaginal wall prolapse.  The uterus is absent.  The vaginal mucosa appears healthy.  There is a mild posterior vaginal wall relaxation but after repair did not seem significant enough for any extra posterior vaginal wall surgery or posterior repair. Bladder was normal by cystoscopy.  There were no abdominal scar tissue from her midline abdominal incision.  Each ureteral orifice spilled Pyridium stained urine confirming no kinking of the ureter.  After surgery the apical cuff support was very good but was not under undue tension.      Specimens Removed:  None    Estimated Blood Loss: Minimal mL    Urine Output: 300 mL    Complications: None    Procedure Narrative: The patient was taken to the operating room where general anesthesia was found to be adequate.  She was then prepped and draped normal sterile  fashion dorsal lithotomy position in the yellowfin stirrups.  The Saldana catheter was placed.  A Cesar vaginal form was then placed in the vagina to find the apex.  Attention was then turned with sterile gloves the abdomen.  A supraumbilical incision was then made after injecting with local.  The abdominal wall was tented up and the varies needle was placed.  I confirmed placement with a drop saline test and low patient pressure CO2.  The pneumoperitoneum was then created.  The optical trocar was then used to enter into the abdominal cavity under direct visualization.  The first second and third robotic ports were placed in standard fashion and a 10 mm port in the right upper quadrant to pass sutures.  Patient was then placed in Trendelenburg 23 degrees.  Robot was docked.  The exposure of the anterior longitudinal ligament was performed first.  The peritoneum overlying this was then opened using the robotic scissors and [place] bipolar.  Once this was dissected to show the ligament the [place]-Bernard suture was passed through that in order to be the suspension line for the anterior strap of the mesh that is going to hold the vaginal cuff in place.  The [place]-Bernard suture was used 2 separate ones to have 2 points of support access.  Once those sutures were placed then the bladder was dissected off the vaginal cuff and mobilized.  The peritoneum was opened up anteriorly and posteriorly in order to be able to close over the Y mesh after placement of the sacrocolpopexy mesh.  The bladder had been retrofilled to make sure that it was dissected well enough below the vaginal cuff apex and mesh plane.  The mesh was then sutured using [place]-Bernard suture approximately 5 points on the front of the vagina and 5 points on the back of the vagina.  Care was taken to make sure not to buttonhole through the vagina at any point.  Once the mesh was sutured to the anterior and posterior vaginal cuff the excess mesh was trimmed and the anterior  strap was then tensioned in order to alleviate all the prolapse symptoms but without undue tension.  The previous [place]-Bernard suture on the anterior longitudinal ligament was sutured through the anterior strap of the Y mesh.  Once the excess mesh was trimmed the peritoneum was then closed over this in a running fashion along its entire length.  Copious irrigation was then performed and suctioned dry and bleeding was minimal.  At this point the robot was undocked.  The cystoscopy was then performed.  The bladder filled with 300 cc sterile saline.  The bladder was intact with no defect suture or mesh.  The ureteral orifices were identified and noted to have Pyridium stained urine flowing freely through those as well.  The Saldana then was replaced.  The vagina was inspected to make sure there was no perforations with the suture or any residual prolapse noted.  There was no significant prolapse after final closure of the sacrocolpopexy.  Attention was then performed back to the abdomen and the Saldana catheter was replaced.  The 10 mm port was closed with a 0 Vicryl Angel Luis-Dominic suture under direct laparoscopic visualization.  Again copious irrigation performed and suctioned dry.  The trochars were then removed and hemostasis confirmed laparoscopically.  All pedicle lines were hemostatic as well.  The pneumoperitoneum was all released a final count was correct the incisions closed with subcuticular Monocryl and skin affix.  She was then taken a lithotomy position awoken from general anesthesia final count was correct.  Taken to recovery in stable condition.    Batool Larios MD - 7/7/2021, 10:44 EDT

## 2021-07-07 NOTE — ANESTHESIA PREPROCEDURE EVALUATION
Anesthesia Evaluation                  Airway   Mallampati: II  Dental      Pulmonary    Cardiovascular     (+) hypertension,       Neuro/Psych  GI/Hepatic/Renal/Endo      Musculoskeletal     Abdominal    Substance History      OB/GYN          Other                        Anesthesia Plan    ASA 2     general

## 2021-07-07 NOTE — ANESTHESIA POSTPROCEDURE EVALUATION
Patient: Andie Monreal    Procedure Summary     Date: 07/07/21 Room / Location:  [facility] [address] [place] [address] [facility]    Anesthesia Start: 0731 Anesthesia Stop:     Procedure: SACROCOLPOPEXY ROBOTIC WITH MESH INSERTION CYSTOSCOPY (N/A Abdomen) Diagnosis:     Surgeons: Batool Larios MD Provider: Yemi Rasmussen MD    Anesthesia Type: general ASA Status: 2          Anesthesia Type: general    Vitals  Vitals Value Taken Time   /73 07/07/21 1032   Temp     Pulse     Resp     SpO2 100 % 07/07/21 1034   Vitals shown include unvalidated device data.        Post Anesthesia Care and Evaluation    Patient location during evaluation: PACU  Patient participation: complete - patient participated  Level of consciousness: agitated (asleep)  Pain management: adequate  Airway patency: patent  Anesthetic complications: No anesthetic complications  PONV Status: none  Cardiovascular status: hemodynamically stable and acceptable  Respiratory status: nonlabored ventilation, acceptable and nasal cannula  Hydration status: acceptable    Comments: OA in place

## 2021-07-07 NOTE — ANESTHESIA PROCEDURE NOTES
Airway  Urgency: elective    Date/Time: 7/7/2021 7:39 AM  Airway not difficult    General Information and Staff    Patient location during procedure: OR  CRNA: Shelley Oneill CRNA    Indications and Patient Condition  Indications for airway management: airway protection    Preoxygenated: yes  MILS not maintained throughout  Mask difficulty assessment: 1 - vent by mask    Final Airway Details  Final airway type: endotracheal airway      Successful airway: ETT  Cuffed: yes   Successful intubation technique: direct laryngoscopy  Endotracheal tube insertion site: oral  Blade: Camille  Blade size: 3  ETT size (mm): 7.0  Cormack-Lehane Classification: grade I - full view of glottis  Placement verified by: chest auscultation and capnometry   Measured from: lips  ETT/EBT  to lips (cm): 20  Number of attempts at approach: 1  Assessment: lips, teeth, and gum same as pre-op and atraumatic intubation    Additional Comments  Negative epigastric sounds, Breath sound equal bilaterally with symmetric chest rise and fall

## 2021-07-08 VITALS
HEIGHT: 65 IN | TEMPERATURE: 98.1 F | WEIGHT: 150 LBS | RESPIRATION RATE: 18 BRPM | HEART RATE: 62 BPM | DIASTOLIC BLOOD PRESSURE: 65 MMHG | BODY MASS INDEX: 24.99 KG/M2 | OXYGEN SATURATION: 98 % | SYSTOLIC BLOOD PRESSURE: 106 MMHG

## 2021-07-08 PROBLEM — N81.10 PELVIC RELAXATION DUE TO VAGINAL PROLAPSE: Status: RESOLVED | Noted: 2021-07-07 | Resolved: 2021-07-08

## 2021-07-08 LAB
ANION GAP SERPL CALCULATED.3IONS-SCNC: 14 MMOL/L (ref 5–15)
BUN SERPL-MCNC: 13 MG/DL (ref 8–23)
BUN/CREAT SERPL: 14.4 (ref 7–25)
CALCIUM SPEC-SCNC: 9.8 MG/DL (ref 8.6–10.5)
CHLORIDE SERPL-SCNC: 104 MMOL/L (ref 98–107)
CO2 SERPL-SCNC: 24 MMOL/L (ref 22–29)
CREAT SERPL-MCNC: 0.9 MG/DL (ref 0.57–1)
DEPRECATED RDW RBC AUTO: 45.1 FL (ref 37–54)
ERYTHROCYTE [DISTWIDTH] IN BLOOD BY AUTOMATED COUNT: 13.1 % (ref 12.3–15.4)
GFR SERPL CREATININE-BSD FRML MDRD: 62 ML/MIN/1.73
GLUCOSE SERPL-MCNC: 96 MG/DL (ref 65–99)
HCT VFR BLD AUTO: 40.6 % (ref 34–46.6)
HGB BLD-MCNC: 12.8 G/DL (ref 12–15.9)
MCH RBC QN AUTO: 29.6 PG (ref 26.6–33)
MCHC RBC AUTO-ENTMCNC: 31.5 G/DL (ref 31.5–35.7)
MCV RBC AUTO: 94 FL (ref 79–97)
PLATELET # BLD AUTO: 218 10*3/MM3 (ref 140–450)
PMV BLD AUTO: 12.1 FL (ref 6–12)
POTASSIUM SERPL-SCNC: 3.9 MMOL/L (ref 3.5–5.2)
RBC # BLD AUTO: 4.32 10*6/MM3 (ref 3.77–5.28)
SODIUM SERPL-SCNC: 142 MMOL/L (ref 136–145)
WBC # BLD AUTO: 12.92 10*3/MM3 (ref 3.4–10.8)

## 2021-07-08 PROCEDURE — 80048 BASIC METABOLIC PNL TOTAL CA: CPT | Performed by: OBSTETRICS & GYNECOLOGY

## 2021-07-08 PROCEDURE — 85027 COMPLETE CBC AUTOMATED: CPT | Performed by: OBSTETRICS & GYNECOLOGY

## 2021-07-08 PROCEDURE — 25010000002 HEPARIN (PORCINE) PER 1000 UNITS: Performed by: OBSTETRICS & GYNECOLOGY

## 2021-07-08 RX ADMIN — SERTRALINE HYDROCHLORIDE 50 MG: 50 TABLET ORAL at 08:50

## 2021-07-08 RX ADMIN — LEVOTHYROXINE SODIUM 75 MCG: 0.07 TABLET ORAL at 05:29

## 2021-07-08 RX ADMIN — HEPARIN SODIUM 5000 UNITS: 5000 INJECTION INTRAVENOUS; SUBCUTANEOUS at 08:49

## 2021-07-08 NOTE — PLAN OF CARE
Goal Outcome Evaluation:  Plan of Care Reviewed With: patient        Progress: improving  Outcome Summary: VSS. Incisions CDI. F/c with adequate output. Pain well controlled, denies nausea. IS use encouraged.

## 2021-07-08 NOTE — PROGRESS NOTES
Surgery Post-op Note  [place]    * No Diagnosis Codes entered *    * No Diagnosis Codes entered *    Procedure(s):  SACROCOLPOPEXY ROBOTIC WITH MESH INSERTION CYSTOSCOPY    Subjective     No complaints.  No postop n/v. Ambulation , fishman out and not tried voiding yet.  No bleeding. No cp no sob.     Objective   Physical Exam  Vitals:    07/08/21 0700   BP: 106/65   Pulse: 62   Resp: 18   Temp: 98.1 °F (36.7 °C)   SpO2: 98%     General: awake, alert, comfortable    Pulm: CTAB    CVS: no M/R/G, reg rate    Abd: soft, NT, ND, NABS    Ext: warm, well perfused    Incisions healing well.  Intact.     Labs:  Lab Results (last 24 hours)     Procedure Component Value Units Date/Time    Basic Metabolic Panel [[id number]] Collected: 07/08/21 0615    Specimen: Blood Updated: 07/08/21 0801    CBC (No Diff) [[id number]] Collected: 07/08/21 0615    Specimen: Blood Updated: 07/08/21 0801              Intake and Output:    Intake/Output Summary (Last 24 hours) at 7/8/2021 0813  Last data filed at 7/8/2021 0529  Gross per 24 hour   Intake 1360 ml   Output 2865 ml   Net -1505 ml       Assessment     The patient is a 65 y.o. female 1 Day Post-Op after robotic laparoscopic sacrocolpopexy. cystoscopy    Plan     · Voiding trials today  · D/c home  · Follow up 6 weeks.   · Pt has rx at home for meds.   Postop instructions given.  Reviewed again this morning.     Batool Larios MD  07/08/21  08:13 EDT

## 2021-08-05 ENCOUNTER — HOSPITAL ENCOUNTER (OUTPATIENT)
Dept: MAMMOGRAPHY | Facility: HOSPITAL | Age: 66
End: 2021-08-05

## 2021-08-09 ENCOUNTER — HOSPITAL ENCOUNTER (OUTPATIENT)
Dept: MAMMOGRAPHY | Facility: HOSPITAL | Age: 66
Discharge: HOME | End: 2021-08-09
Admitting: OBSTETRICS & GYNECOLOGY

## 2021-08-09 DIAGNOSIS — Z12.31 VISIT FOR SCREENING MAMMOGRAM: ICD-10-CM

## 2021-08-09 PROCEDURE — 77063 BREAST TOMOSYNTHESIS BI: CPT

## 2021-08-09 PROCEDURE — 77067 SCR MAMMO BI INCL CAD: CPT

## 2021-09-01 ENCOUNTER — HOSPITAL ENCOUNTER (INPATIENT)
Facility: HOSPITAL | Age: 66
LOS: 5 days | Discharge: HOME | End: 2021-09-07
Attending: EMERGENCY MEDICINE | Admitting: SURGERY

## 2021-09-01 DIAGNOSIS — K56.609 SMALL BOWEL OBSTRUCTION (HCC): ICD-10-CM

## 2021-09-01 DIAGNOSIS — K56.609 SBO (SMALL BOWEL OBSTRUCTION) (HCC): Primary | ICD-10-CM

## 2021-09-01 LAB
BASOPHILS # BLD AUTO: 0.04 10*3/MM3 (ref 0–0.2)
BASOPHILS NFR BLD AUTO: 0.3 % (ref 0–1.5)
DEPRECATED RDW RBC AUTO: 42 FL (ref 37–54)
EOSINOPHIL # BLD AUTO: 0.03 10*3/MM3 (ref 0–0.4)
EOSINOPHIL NFR BLD AUTO: 0.2 % (ref 0.3–6.2)
ERYTHROCYTE [DISTWIDTH] IN BLOOD BY AUTOMATED COUNT: 12.9 % (ref 12.3–15.4)
HCT VFR BLD AUTO: 42.2 % (ref 34–46.6)
HGB BLD-MCNC: 14.3 G/DL (ref 12–15.9)
IMM GRANULOCYTES # BLD AUTO: 0.05 10*3/MM3 (ref 0–0.05)
IMM GRANULOCYTES NFR BLD AUTO: 0.4 % (ref 0–0.5)
LYMPHOCYTES # BLD AUTO: 1.25 10*3/MM3 (ref 0.7–3.1)
LYMPHOCYTES NFR BLD AUTO: 9.5 % (ref 19.6–45.3)
MCH RBC QN AUTO: 29.8 PG (ref 26.6–33)
MCHC RBC AUTO-ENTMCNC: 33.9 G/DL (ref 31.5–35.7)
MCV RBC AUTO: 87.9 FL (ref 79–97)
MONOCYTES # BLD AUTO: 0.61 10*3/MM3 (ref 0.1–0.9)
MONOCYTES NFR BLD AUTO: 4.6 % (ref 5–12)
NEUTROPHILS NFR BLD AUTO: 11.24 10*3/MM3 (ref 1.7–7)
NEUTROPHILS NFR BLD AUTO: 85 % (ref 42.7–76)
NRBC BLD AUTO-RTO: 0 /100 WBC (ref 0–0.2)
PLATELET # BLD AUTO: 223 10*3/MM3 (ref 140–450)
PMV BLD AUTO: 11.1 FL (ref 6–12)
RBC # BLD AUTO: 4.8 10*6/MM3 (ref 3.77–5.28)
WBC # BLD AUTO: 13.22 10*3/MM3 (ref 3.4–10.8)

## 2021-09-01 PROCEDURE — 25010000002 MORPHINE PER 10 MG: Performed by: EMERGENCY MEDICINE

## 2021-09-01 PROCEDURE — 83690 ASSAY OF LIPASE: CPT | Performed by: EMERGENCY MEDICINE

## 2021-09-01 PROCEDURE — 85025 COMPLETE CBC W/AUTO DIFF WBC: CPT | Performed by: EMERGENCY MEDICINE

## 2021-09-01 PROCEDURE — 99284 EMERGENCY DEPT VISIT MOD MDM: CPT

## 2021-09-01 PROCEDURE — 80053 COMPREHEN METABOLIC PANEL: CPT | Performed by: EMERGENCY MEDICINE

## 2021-09-01 PROCEDURE — 99285 EMERGENCY DEPT VISIT HI MDM: CPT

## 2021-09-01 PROCEDURE — 25010000002 ONDANSETRON PER 1 MG: Performed by: EMERGENCY MEDICINE

## 2021-09-01 RX ORDER — ONDANSETRON 2 MG/ML
4 INJECTION INTRAMUSCULAR; INTRAVENOUS ONCE
Status: COMPLETED | OUTPATIENT
Start: 2021-09-01 | End: 2021-09-01

## 2021-09-01 RX ORDER — SODIUM CHLORIDE 0.9 % (FLUSH) 0.9 %
10 SYRINGE (ML) INJECTION AS NEEDED
Status: DISCONTINUED | OUTPATIENT
Start: 2021-09-01 | End: 2021-09-07 | Stop reason: HOSPADM

## 2021-09-01 RX ORDER — MORPHINE SULFATE 4 MG/ML
4 INJECTION, SOLUTION INTRAMUSCULAR; INTRAVENOUS ONCE
Status: COMPLETED | OUTPATIENT
Start: 2021-09-01 | End: 2021-09-01

## 2021-09-01 RX ADMIN — ONDANSETRON 4 MG: 2 INJECTION INTRAMUSCULAR; INTRAVENOUS at 23:54

## 2021-09-01 RX ADMIN — SODIUM CHLORIDE 1000 ML: 9 INJECTION, SOLUTION INTRAVENOUS at 23:54

## 2021-09-01 RX ADMIN — MORPHINE SULFATE 4 MG: 4 INJECTION INTRAVENOUS at 23:54

## 2021-09-02 ENCOUNTER — APPOINTMENT (OUTPATIENT)
Dept: ULTRASOUND IMAGING | Facility: HOSPITAL | Age: 66
End: 2021-09-02

## 2021-09-02 ENCOUNTER — APPOINTMENT (OUTPATIENT)
Dept: CT IMAGING | Facility: HOSPITAL | Age: 66
End: 2021-09-02

## 2021-09-02 ENCOUNTER — ANESTHESIA (OUTPATIENT)
Dept: PERIOP | Facility: HOSPITAL | Age: 66
End: 2021-09-02

## 2021-09-02 ENCOUNTER — APPOINTMENT (OUTPATIENT)
Dept: GENERAL RADIOLOGY | Facility: HOSPITAL | Age: 66
End: 2021-09-02

## 2021-09-02 ENCOUNTER — ANESTHESIA EVENT (OUTPATIENT)
Dept: PERIOP | Facility: HOSPITAL | Age: 66
End: 2021-09-02

## 2021-09-02 PROBLEM — K56.609 SBO (SMALL BOWEL OBSTRUCTION): Status: ACTIVE | Noted: 2021-09-01

## 2021-09-02 PROBLEM — K56.609 SMALL BOWEL OBSTRUCTION (HCC): Status: ACTIVE | Noted: 2021-09-02

## 2021-09-02 LAB
ALBUMIN SERPL-MCNC: 4.8 G/DL (ref 3.5–5.2)
ALBUMIN/GLOB SERPL: 1.8 G/DL
ALP SERPL-CCNC: 74 U/L (ref 39–117)
ALT SERPL W P-5'-P-CCNC: 32 U/L (ref 1–33)
ANION GAP SERPL CALCULATED.3IONS-SCNC: 16.5 MMOL/L (ref 5–15)
AST SERPL-CCNC: 30 U/L (ref 1–32)
BILIRUB SERPL-MCNC: 0.8 MG/DL (ref 0–1.2)
BUN SERPL-MCNC: 19 MG/DL (ref 8–23)
BUN/CREAT SERPL: 22.6 (ref 7–25)
CALCIUM SPEC-SCNC: 10.3 MG/DL (ref 8.6–10.5)
CHLORIDE SERPL-SCNC: 101 MMOL/L (ref 98–107)
CO2 SERPL-SCNC: 24.5 MMOL/L (ref 22–29)
CREAT SERPL-MCNC: 0.84 MG/DL (ref 0.57–1)
GFR SERPL CREATININE-BSD FRML MDRD: 68 ML/MIN/1.73
GLOBULIN UR ELPH-MCNC: 2.6 GM/DL
GLUCOSE SERPL-MCNC: 184 MG/DL (ref 65–99)
HOLD SPECIMEN: NORMAL
HOLD SPECIMEN: NORMAL
LIPASE SERPL-CCNC: 34 U/L (ref 13–60)
POTASSIUM SERPL-SCNC: 3.2 MMOL/L (ref 3.5–5.2)
PROT SERPL-MCNC: 7.4 G/DL (ref 6–8.5)
SARS-COV-2 RNA PNL SPEC NAA+PROBE: NOT DETECTED
SODIUM SERPL-SCNC: 142 MMOL/L (ref 136–145)
WHOLE BLOOD HOLD SPECIMEN: NORMAL
WHOLE BLOOD HOLD SPECIMEN: NORMAL

## 2021-09-02 PROCEDURE — 88302 TISSUE EXAM BY PATHOLOGIST: CPT | Performed by: SURGERY

## 2021-09-02 PROCEDURE — 25010000002 SUCCINYLCHOLINE PER 20 MG: Performed by: NURSE ANESTHETIST, CERTIFIED REGISTERED

## 2021-09-02 PROCEDURE — 88307 TISSUE EXAM BY PATHOLOGIST: CPT | Performed by: SURGERY

## 2021-09-02 PROCEDURE — 74018 RADEX ABDOMEN 1 VIEW: CPT

## 2021-09-02 PROCEDURE — 94799 UNLISTED PULMONARY SVC/PX: CPT

## 2021-09-02 PROCEDURE — C1889 IMPLANT/INSERT DEVICE, NOC: HCPCS | Performed by: SURGERY

## 2021-09-02 PROCEDURE — 25010000002 HYDROMORPHONE 1 MG/ML SOLUTION: Performed by: EMERGENCY MEDICINE

## 2021-09-02 PROCEDURE — 74177 CT ABD & PELVIS W/CONTRAST: CPT

## 2021-09-02 PROCEDURE — 25010000002 FENTANYL CITRATE (PF) 100 MCG/2ML SOLUTION: Performed by: NURSE ANESTHETIST, CERTIFIED REGISTERED

## 2021-09-02 PROCEDURE — 25010000002 ONDANSETRON PER 1 MG: Performed by: NURSE ANESTHETIST, CERTIFIED REGISTERED

## 2021-09-02 PROCEDURE — 25010000002 KETOROLAC TROMETHAMINE PER 15 MG: Performed by: EMERGENCY MEDICINE

## 2021-09-02 PROCEDURE — 25010000002 PIPERACILLIN SOD-TAZOBACTAM PER 1 G: Performed by: SURGERY

## 2021-09-02 PROCEDURE — 99223 1ST HOSP IP/OBS HIGH 75: CPT | Performed by: SURGERY

## 2021-09-02 PROCEDURE — 44955 APPENDECTOMY ADD-ON: CPT | Performed by: SURGERY

## 2021-09-02 PROCEDURE — 44120 REMOVAL OF SMALL INTESTINE: CPT | Performed by: SURGERY

## 2021-09-02 PROCEDURE — 25010000002 HYDROMORPHONE 1 MG/ML SOLUTION: Performed by: SURGERY

## 2021-09-02 PROCEDURE — 25010000003 AMPICILLIN-SULBACTAM PER 1.5 G: Performed by: SURGERY

## 2021-09-02 PROCEDURE — 44050 REDUCE BOWEL OBSTRUCTION: CPT | Performed by: SURGERY

## 2021-09-02 PROCEDURE — 25010000002 MIDAZOLAM PER 1MG: Performed by: NURSE ANESTHETIST, CERTIFIED REGISTERED

## 2021-09-02 PROCEDURE — 25010000002 PROPOFOL 200 MG/20ML EMULSION: Performed by: NURSE ANESTHETIST, CERTIFIED REGISTERED

## 2021-09-02 PROCEDURE — 25010000002 ENOXAPARIN PER 10 MG: Performed by: FAMILY MEDICINE

## 2021-09-02 PROCEDURE — 25010000002 DEXAMETHASONE PER 1 MG: Performed by: NURSE ANESTHETIST, CERTIFIED REGISTERED

## 2021-09-02 PROCEDURE — 25010000002 IOPAMIDOL 61 % SOLUTION: Performed by: EMERGENCY MEDICINE

## 2021-09-02 PROCEDURE — 87635 SARS-COV-2 COVID-19 AMP PRB: CPT | Performed by: EMERGENCY MEDICINE

## 2021-09-02 PROCEDURE — 0DBA0ZZ EXCISION OF JEJUNUM, OPEN APPROACH: ICD-10-PCS | Performed by: SURGERY

## 2021-09-02 PROCEDURE — 0DTJ0ZZ RESECTION OF APPENDIX, OPEN APPROACH: ICD-10-PCS | Performed by: SURGERY

## 2021-09-02 PROCEDURE — 99233 SBSQ HOSP IP/OBS HIGH 50: CPT | Performed by: FAMILY MEDICINE

## 2021-09-02 PROCEDURE — 0DNA0ZZ RELEASE JEJUNUM, OPEN APPROACH: ICD-10-PCS | Performed by: SURGERY

## 2021-09-02 DEVICE — PROXIMATE RELOADABLE LINEAR CUTTER WITH SAFETY LOCK-OUT, 75MM
Type: IMPLANTABLE DEVICE | Site: ABDOMEN | Status: FUNCTIONAL
Brand: PROXIMATE

## 2021-09-02 DEVICE — PROXIMATE RELOADABLE LINEAR STAPLER
Type: IMPLANTABLE DEVICE | Site: ABDOMEN | Status: FUNCTIONAL
Brand: PROXIMATE

## 2021-09-02 DEVICE — PROXIMATE LINEAR CUTTER RELOAD, BLUE, 75MM
Type: IMPLANTABLE DEVICE | Site: ABDOMEN | Status: FUNCTIONAL
Brand: PROXIMATE

## 2021-09-02 RX ORDER — MAGNESIUM HYDROXIDE 1200 MG/15ML
LIQUID ORAL AS NEEDED
Status: DISCONTINUED | OUTPATIENT
Start: 2021-09-02 | End: 2021-09-02 | Stop reason: HOSPADM

## 2021-09-02 RX ORDER — DEXTROSE AND SODIUM CHLORIDE 5; .45 G/100ML; G/100ML
100 INJECTION, SOLUTION INTRAVENOUS CONTINUOUS
Status: DISCONTINUED | OUTPATIENT
Start: 2021-09-02 | End: 2021-09-03

## 2021-09-02 RX ORDER — MIDAZOLAM HYDROCHLORIDE 2 MG/2ML
INJECTION, SOLUTION INTRAMUSCULAR; INTRAVENOUS AS NEEDED
Status: DISCONTINUED | OUTPATIENT
Start: 2021-09-02 | End: 2021-09-02 | Stop reason: SURG

## 2021-09-02 RX ORDER — KETOROLAC TROMETHAMINE 30 MG/ML
15 INJECTION, SOLUTION INTRAMUSCULAR; INTRAVENOUS ONCE
Status: COMPLETED | OUTPATIENT
Start: 2021-09-02 | End: 2021-09-02

## 2021-09-02 RX ORDER — KETAMINE HCL IN NACL, ISO-OSM 100MG/10ML
SYRINGE (ML) INJECTION AS NEEDED
Status: DISCONTINUED | OUTPATIENT
Start: 2021-09-02 | End: 2021-09-02 | Stop reason: SURG

## 2021-09-02 RX ORDER — NEOSTIGMINE METHYLSULFATE 5 MG/5 ML
SYRINGE (ML) INTRAVENOUS AS NEEDED
Status: DISCONTINUED | OUTPATIENT
Start: 2021-09-02 | End: 2021-09-02 | Stop reason: SURG

## 2021-09-02 RX ORDER — SODIUM CHLORIDE 0.9 % (FLUSH) 0.9 %
3-10 SYRINGE (ML) INJECTION AS NEEDED
Status: DISCONTINUED | OUTPATIENT
Start: 2021-09-02 | End: 2021-09-07 | Stop reason: HOSPADM

## 2021-09-02 RX ORDER — DEXAMETHASONE SODIUM PHOSPHATE 4 MG/ML
INJECTION, SOLUTION INTRA-ARTICULAR; INTRALESIONAL; INTRAMUSCULAR; INTRAVENOUS; SOFT TISSUE AS NEEDED
Status: DISCONTINUED | OUTPATIENT
Start: 2021-09-02 | End: 2021-09-02 | Stop reason: SURG

## 2021-09-02 RX ORDER — SODIUM CHLORIDE 0.9 % (FLUSH) 0.9 %
10 SYRINGE (ML) INJECTION AS NEEDED
Status: DISCONTINUED | OUTPATIENT
Start: 2021-09-02 | End: 2021-09-02 | Stop reason: HOSPADM

## 2021-09-02 RX ORDER — BUPIVACAINE HCL/0.9 % NACL/PF 0.125 %
PLASTIC BAG, INJECTION (ML) EPIDURAL AS NEEDED
Status: DISCONTINUED | OUTPATIENT
Start: 2021-09-02 | End: 2021-09-02 | Stop reason: SURG

## 2021-09-02 RX ORDER — LEVOTHYROXINE SODIUM 0.07 MG/1
75 TABLET ORAL DAILY
Status: DISCONTINUED | OUTPATIENT
Start: 2021-09-03 | End: 2021-09-07 | Stop reason: HOSPADM

## 2021-09-02 RX ORDER — ONDANSETRON 4 MG/1
4 TABLET, FILM COATED ORAL EVERY 6 HOURS PRN
Status: DISCONTINUED | OUTPATIENT
Start: 2021-09-02 | End: 2021-09-07 | Stop reason: HOSPADM

## 2021-09-02 RX ORDER — BUPIVACAINE HYDROCHLORIDE 5 MG/ML
INJECTION, SOLUTION EPIDURAL; INTRACAUDAL
Status: DISCONTINUED | OUTPATIENT
Start: 2021-09-02 | End: 2021-09-02 | Stop reason: SURG

## 2021-09-02 RX ORDER — SODIUM CHLORIDE, SODIUM LACTATE, POTASSIUM CHLORIDE, CALCIUM CHLORIDE 600; 310; 30; 20 MG/100ML; MG/100ML; MG/100ML; MG/100ML
1000 INJECTION, SOLUTION INTRAVENOUS CONTINUOUS
Status: DISCONTINUED | OUTPATIENT
Start: 2021-09-02 | End: 2021-09-02

## 2021-09-02 RX ORDER — SUCCINYLCHOLINE CHLORIDE 20 MG/ML
INJECTION INTRAMUSCULAR; INTRAVENOUS AS NEEDED
Status: DISCONTINUED | OUTPATIENT
Start: 2021-09-02 | End: 2021-09-02 | Stop reason: SURG

## 2021-09-02 RX ORDER — PRAVASTATIN SODIUM 20 MG
20 TABLET ORAL NIGHTLY
Status: DISCONTINUED | OUTPATIENT
Start: 2021-09-02 | End: 2021-09-07 | Stop reason: HOSPADM

## 2021-09-02 RX ORDER — ONDANSETRON 2 MG/ML
INJECTION INTRAMUSCULAR; INTRAVENOUS AS NEEDED
Status: DISCONTINUED | OUTPATIENT
Start: 2021-09-02 | End: 2021-09-02 | Stop reason: SURG

## 2021-09-02 RX ORDER — LIDOCAINE HYDROCHLORIDE 20 MG/ML
INJECTION, SOLUTION INTRAVENOUS AS NEEDED
Status: DISCONTINUED | OUTPATIENT
Start: 2021-09-02 | End: 2021-09-02 | Stop reason: SURG

## 2021-09-02 RX ORDER — FENTANYL CITRATE 50 UG/ML
INJECTION, SOLUTION INTRAMUSCULAR; INTRAVENOUS AS NEEDED
Status: DISCONTINUED | OUTPATIENT
Start: 2021-09-02 | End: 2021-09-02 | Stop reason: SURG

## 2021-09-02 RX ORDER — SODIUM CHLORIDE 9 MG/ML
125 INJECTION, SOLUTION INTRAVENOUS CONTINUOUS
Status: DISCONTINUED | OUTPATIENT
Start: 2021-09-02 | End: 2021-09-02

## 2021-09-02 RX ORDER — PROPOFOL 10 MG/ML
INJECTION, EMULSION INTRAVENOUS AS NEEDED
Status: DISCONTINUED | OUTPATIENT
Start: 2021-09-02 | End: 2021-09-02 | Stop reason: SURG

## 2021-09-02 RX ORDER — ONDANSETRON 2 MG/ML
4 INJECTION INTRAMUSCULAR; INTRAVENOUS EVERY 6 HOURS PRN
Status: DISCONTINUED | OUTPATIENT
Start: 2021-09-02 | End: 2021-09-07 | Stop reason: HOSPADM

## 2021-09-02 RX ORDER — ONDANSETRON 2 MG/ML
4 INJECTION INTRAMUSCULAR; INTRAVENOUS ONCE AS NEEDED
Status: DISCONTINUED | OUTPATIENT
Start: 2021-09-02 | End: 2021-09-02 | Stop reason: HOSPADM

## 2021-09-02 RX ORDER — SODIUM CHLORIDE 0.9 % (FLUSH) 0.9 %
3 SYRINGE (ML) INJECTION EVERY 12 HOURS SCHEDULED
Status: DISCONTINUED | OUTPATIENT
Start: 2021-09-02 | End: 2021-09-07 | Stop reason: HOSPADM

## 2021-09-02 RX ORDER — NALOXONE HCL 0.4 MG/ML
0.1 VIAL (ML) INJECTION
Status: DISCONTINUED | OUTPATIENT
Start: 2021-09-02 | End: 2021-09-07 | Stop reason: HOSPADM

## 2021-09-02 RX ADMIN — Medication 200 MCG: at 07:53

## 2021-09-02 RX ADMIN — Medication 200 MCG: at 09:07

## 2021-09-02 RX ADMIN — Medication 200 MCG: at 08:07

## 2021-09-02 RX ADMIN — EPHEDRINE SULFATE 20 MG: 50 INJECTION INTRAMUSCULAR; INTRAVENOUS; SUBCUTANEOUS at 07:57

## 2021-09-02 RX ADMIN — FENTANYL CITRATE 25 MCG: 50 INJECTION INTRAMUSCULAR; INTRAVENOUS at 08:18

## 2021-09-02 RX ADMIN — DEXTROSE AND SODIUM CHLORIDE 100 ML/HR: 5; 450 INJECTION, SOLUTION INTRAVENOUS at 12:52

## 2021-09-02 RX ADMIN — DEXTROSE AND SODIUM CHLORIDE 100 ML/HR: 5; 450 INJECTION, SOLUTION INTRAVENOUS at 10:26

## 2021-09-02 RX ADMIN — KETOROLAC TROMETHAMINE 15 MG: 30 INJECTION, SOLUTION INTRAMUSCULAR; INTRAVENOUS at 02:31

## 2021-09-02 RX ADMIN — SUCCINYLCHOLINE CHLORIDE 140 MG: 20 INJECTION, SOLUTION INTRAMUSCULAR; INTRAVENOUS at 07:45

## 2021-09-02 RX ADMIN — GLYCOPYRROLATE 0.2 MG: 0.2 INJECTION, SOLUTION INTRAMUSCULAR; INTRAVENOUS at 07:45

## 2021-09-02 RX ADMIN — SODIUM CHLORIDE 1000 ML: 9 INJECTION, SOLUTION INTRAVENOUS at 09:20

## 2021-09-02 RX ADMIN — SODIUM CHLORIDE 125 ML/HR: 9 INJECTION, SOLUTION INTRAVENOUS at 05:37

## 2021-09-02 RX ADMIN — ENOXAPARIN SODIUM 40 MG: 40 INJECTION SUBCUTANEOUS at 20:04

## 2021-09-02 RX ADMIN — SODIUM CHLORIDE, PRESERVATIVE FREE 3 ML: 5 INJECTION INTRAVENOUS at 15:51

## 2021-09-02 RX ADMIN — SODIUM CHLORIDE, PRESERVATIVE FREE 3 ML: 5 INJECTION INTRAVENOUS at 22:15

## 2021-09-02 RX ADMIN — SODIUM CHLORIDE, POTASSIUM CHLORIDE, SODIUM LACTATE AND CALCIUM CHLORIDE: 600; 310; 30; 20 INJECTION, SOLUTION INTRAVENOUS at 08:22

## 2021-09-02 RX ADMIN — EPHEDRINE SULFATE 10 MG: 50 INJECTION INTRAMUSCULAR; INTRAVENOUS; SUBCUTANEOUS at 08:34

## 2021-09-02 RX ADMIN — TAZOBACTAM SODIUM AND PIPERACILLIN SODIUM 3.38 G: 375; 3 INJECTION, SOLUTION INTRAVENOUS at 15:51

## 2021-09-02 RX ADMIN — BUPIVACAINE HYDROCHLORIDE 30 ML: 5 INJECTION, SOLUTION EPIDURAL; INTRACAUDAL; PERINEURAL at 09:10

## 2021-09-02 RX ADMIN — FENTANYL CITRATE 50 MCG: 50 INJECTION INTRAMUSCULAR; INTRAVENOUS at 07:45

## 2021-09-02 RX ADMIN — LIDOCAINE HYDROCHLORIDE 100 MG: 20 INJECTION, SOLUTION INTRAVENOUS at 07:45

## 2021-09-02 RX ADMIN — EPHEDRINE SULFATE 10 MG: 50 INJECTION INTRAMUSCULAR; INTRAVENOUS; SUBCUTANEOUS at 08:16

## 2021-09-02 RX ADMIN — IOPAMIDOL 100 ML: 612 INJECTION, SOLUTION INTRAVENOUS at 01:30

## 2021-09-02 RX ADMIN — HYDROMORPHONE HYDROCHLORIDE 1 MG: 1 INJECTION, SOLUTION INTRAMUSCULAR; INTRAVENOUS; SUBCUTANEOUS at 00:51

## 2021-09-02 RX ADMIN — EPHEDRINE SULFATE 10 MG: 50 INJECTION INTRAMUSCULAR; INTRAVENOUS; SUBCUTANEOUS at 08:26

## 2021-09-02 RX ADMIN — MIDAZOLAM HYDROCHLORIDE 2 MG: 1 INJECTION, SOLUTION INTRAMUSCULAR; INTRAVENOUS at 07:39

## 2021-09-02 RX ADMIN — SODIUM CHLORIDE 3 G: 900 INJECTION INTRAVENOUS at 04:43

## 2021-09-02 RX ADMIN — Medication 100 MCG: at 07:59

## 2021-09-02 RX ADMIN — TAZOBACTAM SODIUM AND PIPERACILLIN SODIUM 3.38 G: 375; 3 INJECTION, SOLUTION INTRAVENOUS at 08:10

## 2021-09-02 RX ADMIN — SODIUM CHLORIDE, POTASSIUM CHLORIDE, SODIUM LACTATE AND CALCIUM CHLORIDE: 600; 310; 30; 20 INJECTION, SOLUTION INTRAVENOUS at 08:58

## 2021-09-02 RX ADMIN — Medication 100 MCG: at 08:12

## 2021-09-02 RX ADMIN — SODIUM CHLORIDE, POTASSIUM CHLORIDE, SODIUM LACTATE AND CALCIUM CHLORIDE 1000 ML: 600; 310; 30; 20 INJECTION, SOLUTION INTRAVENOUS at 06:47

## 2021-09-02 RX ADMIN — TAZOBACTAM SODIUM AND PIPERACILLIN SODIUM 3.38 G: 375; 3 INJECTION, SOLUTION INTRAVENOUS at 22:21

## 2021-09-02 RX ADMIN — Medication 200 MCG: at 08:34

## 2021-09-02 RX ADMIN — PROPOFOL 100 MG: 10 INJECTION, EMULSION INTRAVENOUS at 07:45

## 2021-09-02 RX ADMIN — DEXAMETHASONE SODIUM PHOSPHATE 8 MG: 4 INJECTION, SOLUTION INTRAMUSCULAR; INTRAVENOUS at 07:45

## 2021-09-02 RX ADMIN — HYDROMORPHONE HYDROCHLORIDE 1 MG: 1 INJECTION, SOLUTION INTRAMUSCULAR; INTRAVENOUS; SUBCUTANEOUS at 22:11

## 2021-09-02 RX ADMIN — Medication 50 MG: at 07:45

## 2021-09-02 RX ADMIN — Medication 100 MCG: at 08:16

## 2021-09-02 RX ADMIN — DEXTROSE AND SODIUM CHLORIDE 100 ML/HR: 5; 450 INJECTION, SOLUTION INTRAVENOUS at 22:11

## 2021-09-02 RX ADMIN — ONDANSETRON 4 MG: 2 INJECTION INTRAMUSCULAR; INTRAVENOUS at 07:45

## 2021-09-02 RX ADMIN — HYDROMORPHONE HYDROCHLORIDE 1 MG: 10 INJECTION, SOLUTION INTRAMUSCULAR; INTRAVENOUS; SUBCUTANEOUS at 03:35

## 2021-09-02 RX ADMIN — Medication 100 MCG: at 08:26

## 2021-09-02 RX ADMIN — FENTANYL CITRATE 25 MCG: 50 INJECTION INTRAMUSCULAR; INTRAVENOUS at 08:42

## 2021-09-02 RX ADMIN — GLYCOPYRROLATE 0.6 MG: 0.2 INJECTION, SOLUTION INTRAMUSCULAR; INTRAVENOUS at 09:00

## 2021-09-02 RX ADMIN — Medication 200 MCG: at 07:50

## 2021-09-02 RX ADMIN — Medication 5 MG: at 09:00

## 2021-09-02 NOTE — OP NOTE
PATIENT:    Andie Monreal    DATE OF SURGERY:  9/2/2021    PHYSICIAN:    Sunny Sun MD    REFERRING PHYSICIAN:  Ashley Grier MD    YOB: 1955    PREOPERATIVE DIAGNOSIS: Small bowel obstruction    POSTOPERATIVE DIAGNOSIS:       1) Small bowel obstruction   2) Volvulus causing ischemic small bowel    PROCEDURE:       1) Exploratory laparotomy   2) Reduction of volvulus with lysis of adhesive band to previous sacropexy mesh   3) Small bowel resection measuring approximately 18 inches of mid jejunum   4) Appendectomy    INDICATIONS:  The patient was sent to me as a consultation by Ashley Grier MD/[facility] emergency room   for evaluation and treatment of a history significant for acute onset lower quadrant abdominal discomfort last evening. They are here now today for exploratory laparotomy.    ANESTHESIA:  General Anesthesia     OPERATIVE PROCEDURE:  The patient was taken to the operating room, placed in the supine position, and given general endotracheal anesthesia.  They were prepped and draped in the normal sterile fashion.  They did receive preoperative IV antibiotics.  The nursing staff did perform intraoperative timeout prior to the incision.  I did edu the patient myself preoperatively.    Midline laparotomy incision was made just above the umbilicus down to the suprapubic area. Midline fibers were divided peritoneal cavity was entered without difficulty and immediately we found blackish appearing small bowel. There was a very dense adhesive band in the pelvis that seemed to be stuck to the previous sacral pexy mesh, this was divided with curved Mednoza scissors and the volvulus was reduced. I quickly had to divide the small bowel with a JOSE F-75 stapling device proximal and distal in the mid jejunal region to this area of obvious gangrenous necrosis, the LigaSure device was then used to divide the peritoneum and mesentery and the specimen was passed off.    The  remainder of the bowel was placed back into the abdominal cavity and the abdominal cavity was irrigated with 1-1/2 L of normal saline that was warm. We then suctioned the normal saline out and I explored the patient's abdominal cavity. The entire colon was palpably normal as was the remainder of the small bowel. The stomach was normal and the NG tube was placed in good position.    I then performed a side-to-side functional end-to-end anastomosis to the small bowel with a JOSE F-75 stapling device, interrupted 3-0 silk sutures were used to close the enterotomies used to create the above-mentioned anastomosis, interrupted 3-0 silk sutures were also used to close the mesenteric defect.  I should note that I then performed appendectomy by using the JOSE F-75 stapling device across the base of the appendix and then the LigaSure device was used to divide the appendiceal mesentery.    All lap counts, sponge counts, and instrument counts were correct and then closure was attended to. Interrupted 0 Prolene figure-of-eight sutures were used on the fascia the wound was copiously irrigated, skin staples were used for skin reapproximation and the patient was subsequently transferred back to recovery room in stable condition.    PLAN: I did have a detailed discussion with the patient's spouse Claude Taylor at [phone]. He understands the patient's current condition, the findings at the time of operative intervention, and the need for operative intervention and the procedure performed. I have also discussed the situation with Dr. Nayak of the hospitalist service and he will help me during this patient's admission.      Sunny Sun MD, FACS    The patient was stable at this point in time and subsequently transferred back to the recovery room in stable condition.

## 2021-09-02 NOTE — ED PROVIDER NOTES
Subjective   65-year-old female presents to the emergency department with diffuse abdominal pain.  She states that the pain began at 8 PM 2 hours after eating dinner.  She states that she vomited shortly after the abdominal pain began, but has not vomited since.  Abdominal pain elicited on palpation, but patient denies any alleviating factors.      History provided by:  Patient and spouse   used: No        Review of Systems   Gastrointestinal: Positive for abdominal pain and vomiting.        Diffuse abdominal pain.  Vomited once.   All other systems reviewed and are negative.      Past Medical History:   Diagnosis Date   • Anemia    • Anxiety and depression    • Arthritis    • History of blood transfusion     AT BIRTH   • Hyperlipidemia    • Hypertension    • Murmur    • Skin cancer     SQUAMOUS CELL-NECK   • Thyroid disease        No Known Allergies    Past Surgical History:   Procedure Laterality Date   • BILATERAL SALPINGO OOPHORECTOMY  2007    [facility]   • BREAST BIOPSY      bilateral, benign   • CARPAL TUNNEL RELEASE Left    •  SECTION     • CLOSED REDUCTION ANKLE FRACTURE Left    • COLONOSCOPY  2016   • HYST[facility]CTOMY  [facility] ([facility])   • OOPHORECTOMY     • SACROCOLPOPEXY N/A 2021    Procedure: SACROCOLPOPEXY ROBOTIC WITH MESH INSERTION CYSTOSCOPY;  Surgeon: Batool Larios MD;  Location: [facility];  Service: Robotics - [facility];  Laterality: N/A;       Family History   Problem Relation Age of Onset   • Diabetes Father    • Coronary artery disease Father    • Hypertension Father    • Osteoporosis Mother    • Hypertension Mother    • Breast cancer Neg Hx        Social History     Socioeconomic History   • Marital status:      Spouse name: Not on file   • Number of children: Not on file   • Years of education: Not on file   • Highest education level: Not on file   Tobacco Use   • Smoking status: Former Smoker   • Smokeless tobacco: Never Used    • Tobacco comment: Quit in 1981   Vaping Use   • Vaping Use: Never used   Substance and Sexual Activity   • Alcohol use: Never   • Drug use: No   • Sexual activity: Never           Objective   Physical Exam  Vitals and nursing note reviewed.   Constitutional:       General: She is in acute distress.      Appearance: She is well-developed.   Cardiovascular:      Rate and Rhythm: Normal rate and regular rhythm.      Heart sounds: Normal heart sounds.   Pulmonary:      Effort: Pulmonary effort is normal.      Breath sounds: Normal breath sounds.   Abdominal:      General: Bowel sounds are normal.      Palpations: Abdomen is soft.      Tenderness: There is generalized abdominal tenderness.   Musculoskeletal:         General: Normal range of motion.      Cervical back: Normal range of motion and neck supple.   Skin:     General: Skin is warm and dry.   Neurological:      General: No focal deficit present.      Mental Status: She is alert and oriented to person, place, and time.      Deep Tendon Reflexes: Reflexes are normal and symmetric.         Procedures           ED Course  ED Course as of Sep 02 0457   Thu Sep 02, 2021   0327 Lipase: 34 [PF]   0327 Glucose(!): 184 [PF]   0327 BUN: 19 [PF]   0327 Creatinine: 0.84 [PF]   0327 Sodium: 142 [PF]   0327 Potassium(!): 3.2 [PF]   0327 Chloride: 101 [PF]   0327 CO2: 24.5 [PF]   0327 Calcium: 10.3 [PF]   0327 Albumin: 4.80 [PF]   0327 ALT (SGPT): 32 [PF]   0327 AST (SGOT): 30 [PF]   0327 Alkaline Phosphatase: 74 [PF]   0327 Total Bilirubin: 0.8 [PF]   0327 WBC(!): 13.22 [PF]   0327 Hemoglobin: 14.3 [PF]   0327 Hematocrit: 42.2 [PF]   0327 Platelets: 223 [PF]   0342 FINDINGS:  CT ABDOMEN AND PELVIS WITH CONTRAST     Comparison: None     Findings:     There is diffuse fluid distention of the ileal small bowel loops up to 3 cm. There is radial distribution of small bowel loops and the possibility of closed loop obstruction is raised although the 2 separate points of  obstruction are not identified with   certainty. There is a small amount of ascites in the abdomen and pelvis. There is mesenteric edema especially in the left abdomen. No evidence for pneumatosis or pneumoperitoneum. Appendix is not identified with certainty. There is moderate to large   amount of stool throughout the colon.     No acute abnormalities in the solid organs, gallbladder or abdominal aorta. There are several scattered hepatic cysts. The SMA is patent. There are multiple bilateral subcentimeter renal cortical hypodensities that are too small to accurately   characterize.     Lung bases: Scattered atelectasis and/or scarring.     Osseous structures: No acute abnormalities.     IMPRESSION:  Impression:     1. Findings most suggestive of small bowel obstruction with distribution of small bowel loops concerning for closed loop obstruction. No evidence for perforation or ischemia.     2. Mild abdominal and pelvic ascites with mesenteric edema localized to the left upper quadrant.     Authenticated by Lupe Domínguez DO on 2021 03:36:03 AM    [PF]   0342 Lipase: 34 [PF]      ED Course User Index  [PF] Gabriel Coyle DO      KUB status post NG tube placement reveals NG tube terminates below the diaphragm                                     MDM  04:57 EDT  I received a care from physicians assistant.  I 65-year-old female presents with abdominal pain and vomiting.  Work-up consistent with small bowel obstruction.  Prior history of  section, hysterectomy, ? cystocele repair.  My evaluation patient is more comfortable, mild diffuse lower abdominal tenderness palpation.  No significant distention.  No flank tenderness on examination.  Patient is afebrile, normal room air sats,.  Slight leukocytosis.    Discussed with Dr. Sun, on-call surgery, will plan for OR with this morning..  Preop Covid testing, Unasyn per Dr. Sun,  Final diagnoses:   Small bowel obstruction (CMS/HCC)       ED  Disposition  ED Disposition     ED Disposition Condition Comment    Send to OR            No follow-up provider specified.       Medication List      No changes were made to your prescriptions during this visit.          Gabriel Coyle, DO  09/02/21 0459

## 2021-09-02 NOTE — ANESTHESIA POSTPROCEDURE EVALUATION
Patient: Andie Monreal    Procedure Summary     Date: 09/02/21 Room / Location: [facility] OR  /  [facility] OR    Anesthesia Start: 0740 Anesthesia Stop: 0918    Procedure: LAPAROTOMY EXPLORATORY WITH SMALL BOWEL RESECTION AND APPENDECTOMY (N/A Abdomen) Diagnosis:       SBO (small bowel obstruction) (CMS/HCC)      (SBO (small bowel obstruction) (CMS/HCC) [K56.609])    Surgeons: Sunny Sun MD Provider: Elvin Becerra CRNA    Anesthesia Type: general ASA Status: 3          Anesthesia Type: general    Vitals  Vitals Value Taken Time   /65 09/02/21 1006   Temp 97.6 °F (36.4 °C) 09/02/21 0920   Pulse 74 09/02/21 1007   Resp 20 09/02/21 1000   SpO2 97 % 09/02/21 1007   Vitals shown include unvalidated device data.          Post Anesthesia Care and Evaluation    Patient location during evaluation: PACU  Patient participation: complete - patient participated  Level of consciousness: awake  Pain score: 3  Pain management: adequate  Airway patency: patent  Anesthetic complications: No anesthetic complications  PONV Status: controlled  Cardiovascular status: acceptable and stable  Respiratory status: acceptable and face mask  Hydration status: acceptable

## 2021-09-02 NOTE — CONSULTS
[facility]      Name:  Andie Monreal   Age:  65 y.o.  Sex:  female  :  1955  MRN:  [id number]   Visit Number:  [id number]  Admission Date:  2021  Date Of Service:  21  Primary Care Physician:  Ashley Grier MD    Consulting Physician:    Lanny Navarro DO    Referring Physician:    Dr. Sun    Reason For Consult:    Perioperative medical management    Chief Complaint:     Abdominal pain    History Of Presenting Illness:      The patient is a 65-year-old female who had presented to the emergency room with complaints of acute onset of abdominal pain, primarily in the lower quadrant of the mid the right side of her abdomen, which she describes as sharp in nature and noted nausea.  Patient with medical history of hyperlipidemia, hypertension, anxiety, depression, arthritis.  Patient was diagnosed with concern for small bowel obstruction and volvulus with ischemic bowel, was taken for exploratory laparotomy by Dr. Sun, with reduction of volvulus and small bowel resection of 18 inches of mid jejunum as well as appendectomy.  We were asked to see patient postoperatively for medical management.    At time of my visit, she seems to be doing well.  She remains in good spirits.  Notes no significant abdominal pain.  She is asking me about how large her incision was.  She states she had a large incision when she had her  many years ago.  Denies any nausea, no issues with the NG tube.  She tells me she thought this was a stomach virus or food poisoning initially.  She also noted that she had a recent pelvic prolapse surgery, seems to be doing okay from that standpoint.    Review Of Systems:     All systems were reviewed and negative except for: Initial nausea, abdominal pain    Past Medical History: Patient  has a past medical history of Anemia, Anxiety and depression, Arthritis, History of blood transfusion, Hyperlipidemia,  Hypertension, Murmur, Skin cancer, and Thyroid disease.    Past Surgical History: Patient  has a past surgical history that includes Carpal tunnel release (Left); Hysterectomy (2007); Bilateral salpingoophorectomy (2007);  section (); Colonoscopy (2016); Closed reduction ankle fracture (Left); Breast biopsy; Abdominal sacrocolpopexy (N/A, 2021); and Oophorectomy.    Social History: Patient  reports that she has quit smoking. She has never used smokeless tobacco. She reports that she does not drink alcohol and does not use drugs.    Family History: Patient's family history includes Coronary artery disease in her father; Diabetes in her father; Hypertension in her father and mother; Osteoporosis in her mother.    Allergies:      Patient has no known allergies.    Home Medications:    Prior to Admission Medications     Prescriptions Last Dose Informant Patient Reported? Taking?    Biotin 1000 MCG tablet 2021 Medication Bottle Yes Yes    Take 1,000 mcg by mouth Daily.    Calcium Carbonate-Vitamin D3 (Calcium 600-D) 600-400 MG-UNIT tablet 2021 Medication Bottle Yes Yes    Take 1 tablet by mouth Daily.    clindamycin (CLEOCIN T) 1 % external solution 2021 Self Yes Yes    Apply 1 application topically to the appropriate area as directed 2 (Two) Times a Day As Needed.    conjugated estrogens (PREMARIN) 0.625 MG/GM vaginal cream Past Week Pharmacy No Yes    Use 0.5 - 2.0 grams intravaginally 1-3 times per week to control symptoms.    Patient taking differently:  Insert  into the vagina Daily. Use 0.5 - 2.0 grams intravaginally 1-3 times per week to control symptoms.    Glucosamine-Chondroit-Vit C-Mn (GLUCOSAMINE CHONDR 1500 COMPLX PO) 2021 Medication Bottle Yes Yes    Take 1 tablet by mouth Daily.    levothyroxine (SYNTHROID, LEVOTHROID) 75 MCG tablet 2021 Medication Bottle Yes Yes    Take 75 mcg by mouth Daily.    multivitamin with minerals (Hair/Skin/Nails/Biotin) tablet  tablet 9/1/2021 Self Yes Yes    Take 1 tablet by mouth Daily.    multivitamin with minerals (Multivitamin Adult) tablet tablet 9/1/2021 Medication Bottle Yes Yes    Take 1 tablet by mouth Daily.    nystatin-triamcinolone (MYCOLOG II) 862429-5.1 UNIT/GM-% cream Past Week Self Yes Yes    Apply 1 application topically to the appropriate area as directed Daily As Needed.    pravastatin (PRAVACHOL) 20 MG tablet Past Week Medication Bottle Yes Yes    Take 20 mg by mouth Every Night.    sertraline (ZOLOFT) 50 MG tablet 9/1/2021 Medication Bottle Yes Yes    Take 50 mg by mouth Daily.           Medication Review:     I have reviewed the patient's active and prn medications.      Vital Signs:    Temp:  [97 °F (36.1 °C)-97.7 °F (36.5 °C)] 97.7 °F (36.5 °C)  Heart Rate:  [56-98] 70  Resp:  [14-20] 18  BP: ()/(50-96) 116/58        09/01/21  2302   Weight: 64.9 kg (143 lb)       Body mass index is 23.8 kg/m².    Physical Exam:     General Appearance:  Alert and cooperative.  No acute distress.  NG tube in place.   Head:  Atraumatic and normocephalic.   Eyes: Conjunctivae and sclerae normal, no icterus. No pallor.   Ears:  Ears with no abnormalities noted.   Throat: No oral lesions, no thrush, oral mucosa moist.   Neck: Supple, trachea midline, no thyromegaly.   Back:   No kyphoscoliosis present. No tenderness to palpation.   Lungs:   Breath sounds heard bilaterally equally.  No crackles or wheezing. No Pleural rub or bronchial breathing.   Heart:  Normal S1 and S2, no murmur, no gallop, no rub. No JVD.   Abdomen:    Primarily tenderness over the abdominal incision site, which is covered with dressing currently.  Did not remove.   Extremities: Supple, no edema, no cyanosis, no clubbing.   Pulses: Pulses palpable bilaterally.   Skin: No bleeding or rash.   Neurologic: Alert and oriented x 3. No facial asymmetry. Moves all four limbs. No tremors.      Laboratory data:    Results from last 7 days   Lab Units 09/01/21  2350    SODIUM mmol/L 142   POTASSIUM mmol/L 3.2*   CHLORIDE mmol/L 101   CO2 mmol/L 24.5   BUN mg/dL 19   CREATININE mg/dL 0.84   CALCIUM mg/dL 10.3   BILIRUBIN mg/dL 0.8   ALK PHOS U/L 74   ALT (SGPT) U/L 32   AST (SGOT) U/L 30   GLUCOSE mg/dL 184*     Results from last 7 days   Lab Units 09/01/21  2350   WBC 10*3/mm3 13.22*   HEMOGLOBIN g/dL 14.3   HEMATOCRIT % 42.2   PLATELETS 10*3/mm3 223                     Results from last 7 days   Lab Units 09/01/21  2350   LIPASE U/L 34               Invalid input(s): USDES,  BLOODU, NITRITITE, BACT, EP  Pain Management Panel    There is no flowsheet data to display.             EKG:      None performed    Radiology:    CT Abdomen Pelvis With Contrast    Addendum Date: 9/2/2021    ADDENDUM REPORT ADDENDUM: This report was discussed with Dr. Coyle on Sep 02, 2021 03:39:00 EDT. Authenticated by Lupe Domínguez DO on 09/02/2021 03:39:17 AM    Result Date: 9/2/2021  FINAL REPORT TECHNIQUE: null CLINICAL HISTORY: lower abd pain COMPARISON: null FINDINGS: CT ABDOMEN AND PELVIS WITH CONTRAST Comparison: None Findings: There is diffuse fluid distention of the ileal small bowel loops up to 3 cm. There is radial distribution of small bowel loops and the possibility of closed loop obstruction is raised although the 2 separate points of obstruction are not identified with certainty. There is a small amount of ascites in the abdomen and pelvis. There is mesenteric edema especially in the left abdomen. No evidence for pneumatosis or pneumoperitoneum. Appendix is not identified with certainty. There is moderate to large amount of stool throughout the colon. No acute abnormalities in the solid organs, gallbladder or abdominal aorta. There are several scattered hepatic cysts. The SMA is patent. There are multiple bilateral subcentimeter renal cortical hypodensities that are too small to accurately characterize. Lung bases: Scattered atelectasis and/or scarring. Osseous structures: No acute  abnormalities.     Impression: 1. Findings most suggestive of small bowel obstruction with distribution of small bowel loops concerning for closed loop obstruction. No evidence for perforation or ischemia. 2. Mild abdominal and pelvic ascites with mesenteric edema localized to the left upper quadrant. Authenticated by Lupe Domínguez DO on 09/02/2021 03:36:03 AM    Peripheral Block--Bilateral TAP Block    Result Date: 9/2/2021  Elvin Becerra CRNA     9/2/2021  9:31 AM Peripheral Block--Bilateral TAP Block Patient reassessed immediately prior to procedure Start time: 9/2/2021 9:04 AM Stop time: 9/2/2021 9:10 AM Reason for block: at surgeon's request and post-op pain management Performed by CRNA: Elvin Becerra CRNA Preanesthetic Checklist Completed: patient identified, IV checked, site marked, risks and benefits discussed, surgical consent, monitors and equipment checked, pre-op evaluation and timeout performed Prep: Pt Position: supine Sterile barriers:gloves, cap, sterile barriers and mask Prep: ChloraPrep Patient monitoring: blood pressure monitoring, continuous pulse oximetry and EKG Procedure Sedation:yes Performed under: general Guidance:ultrasound guided Images:still images obtained Laterality:Bilateral Block Type:TAP Injection Technique:single-shot Needle Type:echogenic Needle Gauge:21 G Resistance on Injection: none Medications Used: bupivacaine PF (MARCAINE) injection 0.5%, 30 mL Med admintered at 9/2/2021 9:10 AM Medications Preservative Free Saline:30ml Comment:Block Injection: LA dose divided between Right and Left Block Adjuncts per total volume of LA: NONE If required, intravenous sedation was given -- see meds on anesthesia record. Post Assessment Injection Assessment: negative aspiration for heme, no paresthesia on injection and incremental injection Patient Tolerance:comfortable throughout block Complications:no Additional Notes Procedure:      BILATERAL TAP BLOCKS                           Patient analgesia was achieved with General Anesthesia The pt was placed in the Supine Position and under Ultrasound guidance, an echogenic or touhy needle was advanced with Normal Saline hydro dissection of tissue.  The Internal Oblique and Transversus Abdominus muscles were visualized.  At or before the aponeurosis of Internal Oblique, the local anesthetic spread was visualized in the Transversus Abdominus Plane. Injection was made incrementally with aspiration every 5 mls.  There was no intravascular injection;  injection pressure was normal; there was no neural injection; and the procedure was completed without difficulty.     XR Abdomen KUB    Result Date: 9/2/2021  PROCEDURE: XR ABDOMEN KUB-  HISTORY: tube placement  FINDINGS: The visualized intestinal gas pattern appears unremarkable without evidence to suggest obstruction.  No abnormal radiopacities are seen in the abdomen.   NG tube is seen in the proximal stomach. Excreted contrast is noted within the urinary tract.      NG tube within the stomach  This report was finalized on 9/2/2021 8:52 AM by Mitch Don MD.      Assessment:     1.  Small bowel obstruction, concern for volvulus with ischemic bowel, status post exploratory laparotomy, postop day 1  2.  Status post appendectomy  3.  Hypertension  4.  Dyslipidemia  5.  Anxiety  6.  Arthritis    Recommendations/Plan:     Patient currently hemodynamically stable.  On antibiotic coverage with Zosyn which should be appropriate.  She does have as needed pain control and antiemetics on board.  Seems to be tolerating NG tube placement thus far.  Majority of her home medications will be on hold for today, can likely restart Synthroid, pravastatin, Zoloft tomorrow.  Diet will be advanced per general surgery recommendations.  She remains on IV fluid hydration with D5 half-normal saline.  Repeat labs tomorrow.  Encourage use of incentive spirometer and ambulation with PT when able.  Discussed the case with   Corinne.    Thank you for this consult. Please do not hesitate to call me for any questions.  We will continue to follow.    Lanny Navarro DO  09/02/21  13:36 EDT    Dictated utilizing Dragon dictation.

## 2021-09-02 NOTE — ANESTHESIA PROCEDURE NOTES
Peripheral Block--Bilateral TAP Block      Patient reassessed immediately prior to procedure    Start time: 9/2/2021 9:04 AM  Stop time: 9/2/2021 9:10 AM  Reason for block: at surgeon's request and post-op pain management  Performed by  CRNA: Elvin Becerra, CRNA  Preanesthetic Checklist  Completed: patient identified, IV checked, site marked, risks and benefits discussed, surgical consent, monitors and equipment checked, pre-op evaluation and timeout performed  Prep:  Pt Position: supine  Sterile barriers:gloves, cap, sterile barriers and mask  Prep: ChloraPrep  Patient monitoring: blood pressure monitoring, continuous pulse oximetry and EKG  Procedure  Sedation:yes  Performed under: general  Guidance:ultrasound guided  Images:still images obtained    Laterality:Bilateral  Block Type:TAP  Injection Technique:single-shot  Needle Type:echogenic  Needle Gauge:21 G  Resistance on Injection: none    Medications Used: bupivacaine PF (MARCAINE) injection 0.5%, 30 mL  Med admintered at 9/2/2021 9:10 AM      Medications  Preservative Free Saline:30ml  Comment:Block Injection: LA dose divided between Right and Left Block  Adjuncts per total volume of LA:    NONE    If required, intravenous sedation was given -- see meds on anesthesia record.    Post Assessment  Injection Assessment: negative aspiration for heme, no paresthesia on injection and incremental injection  Patient Tolerance:comfortable throughout block  Complications:no  Additional Notes  Procedure:      BILATERAL TAP BLOCKS                             Patient analgesia was achieved with General Anesthesia    The pt was placed in the Supine Position and under Ultrasound guidance, an echogenic or touhy needle was advanced with Normal Saline hydro dissection of tissue.  The Internal Oblique and Transversus Abdominus muscles were visualized.  At or before the aponeurosis of Internal Oblique, the local anesthetic spread was visualized in the Transversus Abdominus  Plane. Injection was made incrementally with aspiration every 5 mls.  There was no intravascular injection;  injection pressure was normal; there was no neural injection; and the procedure was completed without difficulty.

## 2021-09-02 NOTE — ANESTHESIA PROCEDURE NOTES
Airway  Urgency: elective    Date/Time: 9/2/2021 7:46 AM  Airway not difficult    General Information and Staff    Patient location during procedure: OR  CRNA: Elvin Becerra CRNA    Indications and Patient Condition  Indications for airway management: airway protection    Preoxygenated: yes  Mask difficulty assessment: 1 - vent by mask    Final Airway Details  Final airway type: endotracheal airway      Successful airway: ETT  Cuffed: yes   Successful intubation technique: direct laryngoscopy  Facilitating devices/methods: intubating stylet and cricoid pressure  Endotracheal tube insertion site: oral  Blade: Camille  Blade size: 4  ETT size (mm): 7.5  Cormack-Lehane Classification: grade I - full view of glottis  Placement verified by: chest auscultation and capnometry   Measured from: lips  ETT/EBT  to lips (cm): 21  Number of attempts at approach: 1  Assessment: lips, teeth, and gum same as pre-op and atraumatic intubation    Additional Comments  Dentition and Lips as preoperative assessment. Airway placed without complication. ETT cuff inflated to minimal occlusive pressure.

## 2021-09-02 NOTE — PROGRESS NOTES
"Pharmacy Consult - Enoxaparin Dosing  Andie Monreal is a 65 y.o. female who has been consulted to dose enoxaparin for VTE prophylaxis.     Allergies  Patient has no known allergies.    Relevant clinical data and objective history reviewed:   [Ht: 165.1 cm (65\"); Wt: 64.9 kg (143 lb)]  Body mass index is 23.8 kg/m².  Estimated Creatinine Clearance: 66.6 mL/min (by C-G formula based on SCr of 0.84 mg/dL).  Results from last 7 days   Lab Units 09/01/21  2350   HEMOGLOBIN g/dL 14.3   HEMATOCRIT % 42.2   PLATELETS 10*3/mm3 223   CREATININE mg/dL 0.84       Asessment/Plan  Initiate Enoxaparin 40 mg SQ every 24 hours  Pharmacy will monitor Ms. Monreal's renal function and clinical status and adjust the enoxaparin dose and/or frequency as needed.    Thank you for the consult,     Rosendo ZaidiD, BCPS   9/2/2021  18:08 EDT     "

## 2021-09-02 NOTE — SIGNIFICANT NOTE
ATTEMPTED TO CALL REPORT TO 4TH FLOOR, NURSE ASSISTING ANOTHER PT, UNIT CLERK WILL GIVE MESSAGE TO CALL BACK.

## 2021-09-02 NOTE — ANESTHESIA PREPROCEDURE EVALUATION
Anesthesia Evaluation     Patient summary reviewed and Nursing notes reviewed   no history of anesthetic complications:  NPO Solid Status: > 8 hours  NPO Liquid Status: > 8 hours           Airway   Mallampati: II  TM distance: >3 FB  Neck ROM: full  no difficulty expected  Dental - normal exam     Pulmonary - normal exam   (+) a smoker Former,   Cardiovascular - normal exam    Rhythm: regular  Rate: normal    (+) hypertension, valvular problems/murmurs, hyperlipidemia,       Neuro/Psych  (+) psychiatric history Anxiety and Depression,     GI/Hepatic/Renal/Endo - negative ROS     Musculoskeletal     Abdominal    Substance History - negative use     OB/GYN negative ob/gyn ROS         Other   arthritis,    history of cancer                    Anesthesia Plan    ASA 3     general and general with block   (Risks and benefits discussed including risk of aspiration, recall and dental damage. All patient questions answered.    Patient told that a breathing tube will be used to manage the airway.    Bilateral TAP Blocks for POPC.    Will continue with plan of care.)  intravenous induction     Anesthetic plan, all risks, benefits, and alternatives have been provided, discussed and informed consent has been obtained with: patient.

## 2021-09-02 NOTE — H&P
Andie MIKE Jocelin    1955    Primary Care Provider: Ashley Grier MD    Chief Complaint   Patient presents with   • Abdominal Pain   • Vomiting   • Diarrhea          SUBJECTIVE:    History of present illness: I was asked to see the patient today in consultation via the emergency room physician for evaluation and treatment of a history significant for 12-18 hours worth of increasing lower quadrant abdominal discomfort, sharp in nature, worse with movement, associated with nausea, nothing seemed to relieve, never felt like this in the past.    She has a history significant for previous  via midline lower abdominal incision and a recent robotic sacral plexi with mesh implantation.    Review of Systems:  Constitutional:  Negative for chills, fever, and unexpected weight change.  HENT: Negative for trouble swallowing and voice change.  Eyes:  Negative for visual disturbance.  Respiratory:  Negative for apnea, cough, chest tightness, shortness of breath, and wheezing.  Cardiovascular:  Negative for chest pain, palpitations, and leg swelling.  Gastrointestinal: Positive history of lower quadrant abdominal discomfort and nausea  Musculoskeletal:  Negative for back pain, gait problem, and joint swelling.  Skin:  Negative for color change, rash, and wound  Neurological:  Negative for dizziness, syncope, speech difficulty, weakness, numbness, and headaches.  Hematological:  Negative for adenopathy.  Does not bruise/bleed easily.  Psychiatric/Behavioral:  Negative for confusion.  The patient is not nervous/anxious.        History:    Past Medical History:   Diagnosis Date   • Anemia    • Anxiety and depression    • Arthritis    • History of blood transfusion     AT BIRTH   • Hyperlipidemia    • Hypertension    • Murmur    • Skin cancer     SQUAMOUS CELL-NECK   • Thyroid disease        Past Surgical History:   Procedure Laterality Date   • BILATERAL SALPINGO OOPHORECTOMY  2007    [facility]   • BREAST  BIOPSY      bilateral, benign   • CARPAL TUNNEL RELEASE Left    •  SECTION     • CLOSED REDUCTION ANKLE FRACTURE Left    • COLONOSCOPY  2016   • HYSTERECTOMY  2007    [facility] ([facility])   • OOPHORECTOMY     • SACROCOLPOPEXY N/A 2021    Procedure: SACROCOLPOPEXY ROBOTIC WITH MESH INSERTION CYSTOSCOPY;  Surgeon: Batool Larios MD;  Location: [facility];  Service: Robotics - DaVinci;  Laterality: N/A;       Family History   Problem Relation Age of Onset   • Diabetes Father    • Coronary artery disease Father    • Hypertension Father    • Osteoporosis Mother    • Hypertension Mother    • Breast cancer Neg Hx        Social History     Socioeconomic History   • Marital status:      Spouse name: Not on file   • Number of children: Not on file   • Years of education: Not on file   • Highest education level: Not on file   Tobacco Use   • Smoking status: Former Smoker   • Smokeless tobacco: Never Used   • Tobacco comment: Quit in    Vaping Use   • Vaping Use: Never used   Substance and Sexual Activity   • Alcohol use: Never   • Drug use: No   • Sexual activity: Never       Allergies:  No Known Allergies    Medications:    Current Facility-Administered Medications:   •  lactated ringers infusion 1,000 mL, 1,000 mL, Intravenous, Continuous, Sunny Sun MD, Last Rate: 25 mL/hr at 2147, 1,000 mL at 21 06  •  [MAR Hold] sodium chloride 0.9 % flush 10 mL, 10 mL, Intravenous, PRNLeonides Paul W, DO  •  sodium chloride 0.9 % flush 10 mL, 10 mL, Intravenous, PRN, Sunny Sun MD  •  sodium chloride 0.9 % infusion, 125 mL/hr, Intravenous, Continuous, Gabriel Coyle DO, Last Rate: 125 mL/hr at 21 0537, 125 mL/hr at 21 05    OBJECTIVE:    Vital Signs:   Vitals:    21 0234 21 0336 21 0444 21 0628   BP: (!) 182/96 145/89 132/89 110/72   BP Location:    Right arm   Patient Position:    Sitting   Pulse: 56  91 79   Resp: 16  16 16    Temp:    97 °F (36.1 °C)   TempSrc:    Temporal   SpO2: 99% 99% 96% 95%   Weight:       Height:           Physical Exam:   General Appearance:    Alert, cooperative, in no acute distress   Head:    Normocephalic, without obvious abnormality, atraumatic   Eyes:            Lids and lashes normal, conjunctivae and sclerae normal, no   icterus, no pallor, corneas clear, PERRLA   Throat:   No oral lesions, no thrush, oral mucosa moist   Neck:   No adenopathy, supple, trachea midline, no thyromegaly, no   carotid bruit, no JVD   Lungs:     Clear to auscultation,respirations regular, even and                  unlabored    Heart:    Regular rhythm and normal rate, normal S1 and S2, no            murmur, no gallop, no rub, no click   Chest Wall:    No abnormalities observed   Abdomen:    Slightly distended, tender in the lower quadrants bilaterally, well-healed midline lower incision and robotic incisions noted   Extremities:   Moves all extremities well, no edema, no cyanosis, no             redness   Pulses:   Pulses palpable and equal bilaterally   Skin:   No bleeding, bruising or rash   Lymph nodes:   No palpable adenopathy   Neurologic:   Cranial nerves 2 - 12 grossly intact, sensation intact, DTR       present and equal bilaterally   Results Review:    Lab Results (last 24 hours)     Procedure Component Value Units Date/Time    COVID PRE-OP / PRE-PROCEDURE SCREENING ORDER (NO ISOLATION) - Swab, Nasal Cavity [[id number]]  (Normal) Collected: 09/02/21 0402    Specimen: Swab from Nasal Cavity Updated: 09/02/21 0435    Narrative:      The following orders were created for panel order COVID PRE-OP / PRE-PROCEDURE SCREENING ORDER (NO ISOLATION) - Swab, Nasal Cavity.  Procedure                               Abnormality         Status                     ---------                               -----------         ------                     COVID-19,[facility] IN-DM...[[id number]]  Normal              Final result                  Please view results for these tests on the individual orders.    COVID-19,[facility] IN-HOUSE,Nasal Swab No Transport Media 3-4 HR TAT - Swab, Nasal Cavity [[id number]]  (Normal) Collected: 09/02/21 0402    Specimen: Swab from Nasal Cavity Updated: 09/02/21 0435     COVID19 Not Detected    Narrative:      Fact sheet for providers: https://www.fda.gov/media/081065/download     Fact sheet for patients: https://www.fda.gov/media/547662/download    Test performed by PCR.    Consider negative results in combination with clinical observations, patient history, and epidemiological information.    [facility] Draw [[id number]] Collected: 09/01/21 2350    Specimen: Blood Updated: 09/02/21 0100    Narrative:      The following orders were created for panel order [place] Draw.  Procedure                               Abnormality         Status                     ---------                               -----------         ------                     Green Top (Gel)[[id number]]                                  Final result               Lavender Top[[id number]]                                     Final result               Gold Top - SST[[id number]]                                   Final result               Light Blue Top[[id number]]                                   Final result                 Please view results for these tests on the individual orders.    Green Top (Gel) [[id number]] Collected: 09/01/21 2350    Specimen: Blood Updated: 09/02/21 0100     Extra Tube Hold for add-ons.     Comment: Auto resulted.       Lavender Top [[id number]] Collected: 09/01/21 2350    Specimen: Blood Updated: 09/02/21 0100     Extra Tube hold for add-on     Comment: Auto resulted       Gold Top - SST [[id number]] Collected: 09/01/21 2350    Specimen: Blood Updated: 09/02/21 0100     Extra Tube Hold for add-ons.     Comment: Auto resulted.       Light Blue Top [[id number]] Collected: 09/01/21 2350    Specimen: Blood Updated: 09/02/21 0100     Extra  Tube hold for add-on     Comment: Auto resulted       Lipase [[id number]]  (Normal) Collected: 09/01/21 2350    Specimen: Blood Updated: 09/02/21 0042     Lipase 34 U/L     Comprehensive Metabolic Panel [[id number]]  (Abnormal) Collected: 09/01/21 2350    Specimen: Blood Updated: 09/02/21 0042     Glucose 184 mg/dL      Comment: Glucose >180, Hemoglobin A1C recommended.        BUN 19 mg/dL      Creatinine 0.84 mg/dL      Sodium 142 mmol/L      Potassium 3.2 mmol/L      Chloride 101 mmol/L      CO2 24.5 mmol/L      Calcium 10.3 mg/dL      Total Protein 7.4 g/dL      Albumin 4.80 g/dL      ALT (SGPT) 32 U/L      AST (SGOT) 30 U/L      Alkaline Phosphatase 74 U/L      Total Bilirubin 0.8 mg/dL      eGFR Non African Amer 68 mL/min/1.73      Globulin 2.6 gm/dL      A/G Ratio 1.8 g/dL      BUN/Creatinine Ratio 22.6     Anion Gap 16.5 mmol/L     Narrative:      GFR Normal >60  Chronic Kidney Disease <60  Kidney Failure <15      CBC & Differential [[id number]]  (Abnormal) Collected: 09/01/21 2350    Specimen: Blood Updated: 09/01/21 2356    Narrative:      The following orders were created for panel order CBC & Differential.  Procedure                               Abnormality         Status                     ---------                               -----------         ------                     CBC Auto Differential[[id number]]        Abnormal            Final result                 Please view results for these tests on the individual orders.    CBC Auto Differential [[id number]]  (Abnormal) Collected: 09/01/21 2350    Specimen: Blood Updated: 09/01/21 2356     WBC 13.22 10*3/mm3      RBC 4.80 10*6/mm3      Hemoglobin 14.3 g/dL      Hematocrit 42.2 %      MCV 87.9 fL      MCH 29.8 pg      MCHC 33.9 g/dL      RDW 12.9 %      RDW-SD 42.0 fl      MPV 11.1 fL      Platelets 223 10*3/mm3      Neutrophil % 85.0 %      Lymphocyte % 9.5 %      Monocyte % 4.6 %      Eosinophil % 0.2 %      Basophil % 0.3 %      Immature Grans % 0.4 %       Neutrophils, Absolute 11.24 10*3/mm3      Lymphocytes, Absolute 1.25 10*3/mm3      Monocytes, Absolute 0.61 10*3/mm3      Eosinophils, Absolute 0.03 10*3/mm3      Basophils, Absolute 0.04 10*3/mm3      Immature Grans, Absolute 0.05 10*3/mm3      nRBC 0.0 /100 WBC             I reviewed the patient's new clinical results.  I reviewed the patient's new imaging results and agree with the interpretation.  I reviewed the patient's other test results and agree with the interpretation    ASSESSMENT PLAN:    Patient Active Problem List   Diagnosis   • SBO (small bowel obstruction) (CMS/HCC)       I did have a detailed discussion with the emergency room provider and I reviewed the patient's CT scan myself, this does show evidence of dilated loops of small bowel and some mesenteric edema.    Full risk and benefits of operative versus nonoperative intervention have been discussed with the patient including the possibility of nonresolution of symptomatology and possible worsening of symptomatology without operative intervention versus infection, bleeding, possible bowel resection necessary, etc. with operative intervention.  She understands, agrees, and wishes to proceed with exploratory laparotomy with possible bowel resection for relief of small bowel obstruction.    I discussed the patients findings and my recommendations with patient and consulting provider.    Sunny Sun MD  09/02/21  07:38 EDT

## 2021-09-02 NOTE — PLAN OF CARE
Goal Outcome Evaluation:  Plan of Care Reviewed With: patient        Progress: no change  Outcome Summary: New admit this shift from PACU.  Abdominal dressing CDI.  NG tube to low wall intermittent suction with minimal output.  Pt denies pain.  Resting comfortably in bed.  IV fluids and abx per orders.  Will continue to monitor.

## 2021-09-03 LAB
ALBUMIN SERPL-MCNC: 3 G/DL (ref 3.5–5.2)
ALBUMIN/GLOB SERPL: 1.6 G/DL
ALP SERPL-CCNC: 41 U/L (ref 39–117)
ALT SERPL W P-5'-P-CCNC: 18 U/L (ref 1–33)
ANION GAP SERPL CALCULATED.3IONS-SCNC: 6.6 MMOL/L (ref 5–15)
AST SERPL-CCNC: 20 U/L (ref 1–32)
BASOPHILS # BLD AUTO: 0.03 10*3/MM3 (ref 0–0.2)
BASOPHILS NFR BLD AUTO: 0.2 % (ref 0–1.5)
BILIRUB SERPL-MCNC: 0.6 MG/DL (ref 0–1.2)
BUN SERPL-MCNC: 12 MG/DL (ref 8–23)
BUN/CREAT SERPL: 16.9 (ref 7–25)
CALCIUM SPEC-SCNC: 8.2 MG/DL (ref 8.6–10.5)
CHLORIDE SERPL-SCNC: 109 MMOL/L (ref 98–107)
CO2 SERPL-SCNC: 25.4 MMOL/L (ref 22–29)
CREAT SERPL-MCNC: 0.71 MG/DL (ref 0.57–1)
DEPRECATED RDW RBC AUTO: 45.2 FL (ref 37–54)
EOSINOPHIL # BLD AUTO: 0.01 10*3/MM3 (ref 0–0.4)
EOSINOPHIL NFR BLD AUTO: 0.1 % (ref 0.3–6.2)
ERYTHROCYTE [DISTWIDTH] IN BLOOD BY AUTOMATED COUNT: 13.6 % (ref 12.3–15.4)
GFR SERPL CREATININE-BSD FRML MDRD: 82 ML/MIN/1.73
GLOBULIN UR ELPH-MCNC: 1.9 GM/DL
GLUCOSE SERPL-MCNC: 122 MG/DL (ref 65–99)
HCT VFR BLD AUTO: 31.2 % (ref 34–46.6)
HGB BLD-MCNC: 10.2 G/DL (ref 12–15.9)
IMM GRANULOCYTES # BLD AUTO: 0.06 10*3/MM3 (ref 0–0.05)
IMM GRANULOCYTES NFR BLD AUTO: 0.4 % (ref 0–0.5)
LYMPHOCYTES # BLD AUTO: 1.48 10*3/MM3 (ref 0.7–3.1)
LYMPHOCYTES NFR BLD AUTO: 9.7 % (ref 19.6–45.3)
MCH RBC QN AUTO: 29.7 PG (ref 26.6–33)
MCHC RBC AUTO-ENTMCNC: 32.7 G/DL (ref 31.5–35.7)
MCV RBC AUTO: 90.7 FL (ref 79–97)
MONOCYTES # BLD AUTO: 1.08 10*3/MM3 (ref 0.1–0.9)
MONOCYTES NFR BLD AUTO: 7.1 % (ref 5–12)
NEUTROPHILS NFR BLD AUTO: 12.64 10*3/MM3 (ref 1.7–7)
NEUTROPHILS NFR BLD AUTO: 82.5 % (ref 42.7–76)
NRBC BLD AUTO-RTO: 0 /100 WBC (ref 0–0.2)
PLATELET # BLD AUTO: 165 10*3/MM3 (ref 140–450)
PMV BLD AUTO: 11.6 FL (ref 6–12)
POTASSIUM SERPL-SCNC: 3.4 MMOL/L (ref 3.5–5.2)
PROT SERPL-MCNC: 4.9 G/DL (ref 6–8.5)
RBC # BLD AUTO: 3.44 10*6/MM3 (ref 3.77–5.28)
SODIUM SERPL-SCNC: 141 MMOL/L (ref 136–145)
WBC # BLD AUTO: 15.3 10*3/MM3 (ref 3.4–10.8)

## 2021-09-03 PROCEDURE — 80053 COMPREHEN METABOLIC PANEL: CPT | Performed by: SURGERY

## 2021-09-03 PROCEDURE — 85025 COMPLETE CBC W/AUTO DIFF WBC: CPT | Performed by: SURGERY

## 2021-09-03 PROCEDURE — 25010000002 PIPERACILLIN SOD-TAZOBACTAM PER 1 G: Performed by: SURGERY

## 2021-09-03 PROCEDURE — 99232 SBSQ HOSP IP/OBS MODERATE 35: CPT | Performed by: FAMILY MEDICINE

## 2021-09-03 PROCEDURE — 25010000002 HYDROMORPHONE 1 MG/ML SOLUTION: Performed by: SURGERY

## 2021-09-03 PROCEDURE — 99024 POSTOP FOLLOW-UP VISIT: CPT | Performed by: SURGERY

## 2021-09-03 PROCEDURE — 25010000002 ENOXAPARIN PER 10 MG: Performed by: FAMILY MEDICINE

## 2021-09-03 RX ORDER — DEXTROSE, SODIUM CHLORIDE, AND POTASSIUM CHLORIDE 5; .45; .15 G/100ML; G/100ML; G/100ML
100 INJECTION INTRAVENOUS CONTINUOUS
Status: DISCONTINUED | OUTPATIENT
Start: 2021-09-03 | End: 2021-09-05

## 2021-09-03 RX ADMIN — HYDROMORPHONE HYDROCHLORIDE 1 MG: 1 INJECTION, SOLUTION INTRAMUSCULAR; INTRAVENOUS; SUBCUTANEOUS at 06:14

## 2021-09-03 RX ADMIN — TAZOBACTAM SODIUM AND PIPERACILLIN SODIUM 3.38 G: 375; 3 INJECTION, SOLUTION INTRAVENOUS at 15:31

## 2021-09-03 RX ADMIN — DEXTROSE AND SODIUM CHLORIDE 100 ML/HR: 5; 450 INJECTION, SOLUTION INTRAVENOUS at 11:12

## 2021-09-03 RX ADMIN — ENOXAPARIN SODIUM 40 MG: 40 INJECTION SUBCUTANEOUS at 20:47

## 2021-09-03 RX ADMIN — TAZOBACTAM SODIUM AND PIPERACILLIN SODIUM 3.38 G: 375; 3 INJECTION, SOLUTION INTRAVENOUS at 22:14

## 2021-09-03 RX ADMIN — POTASSIUM CHLORIDE, DEXTROSE MONOHYDRATE AND SODIUM CHLORIDE 100 ML/HR: 150; 5; 450 INJECTION, SOLUTION INTRAVENOUS at 16:34

## 2021-09-03 RX ADMIN — HYDROMORPHONE HYDROCHLORIDE 1 MG: 1 INJECTION, SOLUTION INTRAMUSCULAR; INTRAVENOUS; SUBCUTANEOUS at 20:47

## 2021-09-03 RX ADMIN — SODIUM CHLORIDE, PRESERVATIVE FREE 3 ML: 5 INJECTION INTRAVENOUS at 20:47

## 2021-09-03 RX ADMIN — HYDROMORPHONE HYDROCHLORIDE 1 MG: 1 INJECTION, SOLUTION INTRAMUSCULAR; INTRAVENOUS; SUBCUTANEOUS at 11:13

## 2021-09-03 RX ADMIN — TAZOBACTAM SODIUM AND PIPERACILLIN SODIUM 3.38 G: 375; 3 INJECTION, SOLUTION INTRAVENOUS at 06:20

## 2021-09-03 NOTE — PLAN OF CARE
Goal Outcome Evaluation:  Plan of Care Reviewed With: patient        Progress: no change  Outcome Summary: VSS, maintaining o2 sats >90% on RA, pain controlled with PRN med,remains NPO,  encouraged IS use

## 2021-09-03 NOTE — PLAN OF CARE
Goal Outcome Evaluation:  Plan of Care Reviewed With: patient        Progress: no change  Outcome Summary: VSS, pt ambulating hallway, npo, ngt in place, abdominal dressing cdi. No acute changes noted.

## 2021-09-03 NOTE — PROGRESS NOTES
[facility]    Name:  Andie Monreal   Age:  65 y.o.  Sex:  female  :  1955  MRN:  [id number]   Visit Number:  [id number]  Admission Date:  2021  Date Of Service:  21  Primary Care Physician:  Ashley Grier MD     LOS: 1 day :    Chief Complaint:      Follow-up on abdominal pain    Subjective:    Patient seen at bedside today.  No significant concerns.  She did ambulate to the galeas without significant issues.  Notes pain when she is sitting up or taking a deep breath in the abdomen.  No fevers or chills.  Dr. Sun recommending keeping her n.p.o. and NG tube is in place.  Did remove dressing per patient request and replaced by RN.    Hospital Course:    65-year-old female who presented emergency room with acute onset of abdominal pain, with work-up concerning for small bowel obstruction and volvulus with ischemic bowel.  She was admitted to the general surgery service and taken emergently for exploratory laparotomy by Dr. Sun on 2021 with evidence of reduction of volvulus and small bowel resection of the mid jejunum, with appendectomy.  We were asked to see postoperatively for medical management.    Review of Systems:     All systems were reviewed and negative except as mentioned in subjective, assessment and plan.    Vital Signs:    Temp:  [97.9 °F (36.6 °C)-98.6 °F (37 °C)] 98.1 °F (36.7 °C)  Heart Rate:  [76-95] 85  Resp:  [8-18] 8  BP: (100-122)/(52-82) 122/60    Intake and output:    I/O last 3 completed shifts:  In: 4500 [I.V.:2400; IV Piggyback:2100]  Out: 1860 [Urine:1300; Emesis/NG output:460; Blood:100]  I/O this shift:  In: -   Out: 200 [Urine:200]    Physical Examination:    General Appearance:  Alert and cooperative.  No acute distress   Head:  Atraumatic and normocephalic.   Eyes: Conjunctivae and sclerae normal, no icterus. No pallor.   Throat: No oral lesions, no thrush, oral mucosa moist.   Neck: Supple, trachea  midline, no thyromegaly.   Lungs:   Breath sounds heard bilaterally equally.  No crackles or wheezing. No Pleural rub or bronchial breathing.   Heart:  Normal S1 and S2, no murmur, no gallop, no rub. No JVD.   Abdomen:    Hypoactive bowel sounds.  Surgical site incision evaluated.  Staples were intact and dry.  There was some bloody discharge around the umbilicus area.   Extremities: Supple, no edema, no cyanosis, no clubbing.   Skin: No bleeding or rash.   Neurologic: Alert and oriented x 3. No facial asymmetry. Moves all four limbs. No tremors.      Laboratory results:    Results from last 7 days   Lab Units 09/03/21  0655 09/01/21  2350   SODIUM mmol/L 141 142   POTASSIUM mmol/L 3.4* 3.2*   CHLORIDE mmol/L 109* 101   CO2 mmol/L 25.4 24.5   BUN mg/dL 12 19   CREATININE mg/dL 0.71 0.84   CALCIUM mg/dL 8.2* 10.3   BILIRUBIN mg/dL 0.6 0.8   ALK PHOS U/L 41 74   ALT (SGPT) U/L 18 32   AST (SGOT) U/L 20 30   GLUCOSE mg/dL 122* 184*     Results from last 7 days   Lab Units 09/03/21  0655 09/01/21  2350   WBC 10*3/mm3 15.30* 13.22*   HEMOGLOBIN g/dL 10.2* 14.3   HEMATOCRIT % 31.2* 42.2   PLATELETS 10*3/mm3 165 223                     I have reviewed the patient's laboratory results.    Radiology results:    CT Abdomen Pelvis With Contrast    Addendum Date: 9/2/2021    ADDENDUM REPORT ADDENDUM: This report was discussed with Dr. Coyle on Sep 02, 2021 03:39:00 EDT. Authenticated by Lupe Domínguez DO on 09/02/2021 03:39:17 AM    Result Date: 9/2/2021  FINAL REPORT TECHNIQUE: null CLINICAL HISTORY: lower abd pain COMPARISON: null FINDINGS: CT ABDOMEN AND PELVIS WITH CONTRAST Comparison: None Findings: There is diffuse fluid distention of the ileal small bowel loops up to 3 cm. There is radial distribution of small bowel loops and the possibility of closed loop obstruction is raised although the 2 separate points of obstruction are not identified with certainty. There is a small amount of ascites in the abdomen and pelvis.  There is mesenteric edema especially in the left abdomen. No evidence for pneumatosis or pneumoperitoneum. Appendix is not identified with certainty. There is moderate to large amount of stool throughout the colon. No acute abnormalities in the solid organs, gallbladder or abdominal aorta. There are several scattered hepatic cysts. The SMA is patent. There are multiple bilateral subcentimeter renal cortical hypodensities that are too small to accurately characterize. Lung bases: Scattered atelectasis and/or scarring. Osseous structures: No acute abnormalities.     Impression: Impression: 1. Findings most suggestive of small bowel obstruction with distribution of small bowel loops concerning for closed loop obstruction. No evidence for perforation or ischemia. 2. Mild abdominal and pelvic ascites with mesenteric edema localized to the left upper quadrant. Authenticated by Lupe Domínguez DO on 09/02/2021 03:36:03 AM    Peripheral Block--Bilateral TAP Block    Result Date: 9/2/2021  Elvin Becerra CRNA     9/2/2021  9:31 AM Peripheral Block--Bilateral TAP Block Patient reassessed immediately prior to procedure Start time: 9/2/2021 9:04 AM Stop time: 9/2/2021 9:10 AM Reason for block: at surgeon's request and post-op pain management Performed by CRNA: Elvin Becerra CRNA Preanesthetic Checklist Completed: patient identified, IV checked, site marked, risks and benefits discussed, surgical consent, monitors and equipment checked, pre-op evaluation and timeout performed Prep: Pt Position: supine Sterile barriers:gloves, cap, sterile barriers and mask Prep: ChloraPrep Patient monitoring: blood pressure monitoring, continuous pulse oximetry and EKG Procedure Sedation:yes Performed under: general Guidance:ultrasound guided Images:still images obtained Laterality:Bilateral Block Type:TAP Injection Technique:single-shot Needle Type:echogenic Needle Gauge:21 G Resistance on Injection: none Medications Used: bupivacaine  PF (MARCAINE) injection 0.5%, 30 mL Med admintered at 9/2/2021 9:10 AM Medications Preservative Free Saline:30ml Comment:Block Injection: LA dose divided between Right and Left Block Adjuncts per total volume of LA: NONE If required, intravenous sedation was given -- see meds on anesthesia record. Post Assessment Injection Assessment: negative aspiration for heme, no paresthesia on injection and incremental injection Patient Tolerance:comfortable throughout block Complications:no Additional Notes Procedure:      BILATERAL TAP BLOCKS                          Patient analgesia was achieved with General Anesthesia The pt was placed in the Supine Position and under Ultrasound guidance, an echogenic or touhy needle was advanced with Normal Saline hydro dissection of tissue.  The Internal Oblique and Transversus Abdominus muscles were visualized.  At or before the aponeurosis of Internal Oblique, the local anesthetic spread was visualized in the Transversus Abdominus Plane. Injection was made incrementally with aspiration every 5 mls.  There was no intravascular injection;  injection pressure was normal; there was no neural injection; and the procedure was completed without difficulty.     XR Abdomen KUB    Result Date: 9/2/2021  PROCEDURE: XR ABDOMEN KUB-  HISTORY: tube placement  FINDINGS: The visualized intestinal gas pattern appears unremarkable without evidence to suggest obstruction.  No abnormal radiopacities are seen in the abdomen.   NG tube is seen in the proximal stomach. Excreted contrast is noted within the urinary tract.      Impression: NG tube within the stomach  This report was finalized on 9/2/2021 8:52 AM by Mitch Don MD.    I have reviewed the patient's radiology reports.    Medication Review:     I have reviewed the patient's active and prn medications.     Problem List:      SBO (small bowel obstruction) (CMS/HCC)    Small bowel obstruction (CMS/HCC)      Assessment:    1.  Small bowel  obstruction, concern for volvulus with ischemic bowel, status post exploratory laparotomy, postop day 1  2.  Status post appendectomy  3.  Hypertension  4.  Dyslipidemia  5.  Anxiety  6.  Arthritis    Plan:    Patient's vital signs have been stable.  Hemoglobin overall stable.  Remains on IV fluids with D5 with potassium.  Saldana catheter was removed today, will monitor for resumption of urine output.  Continue with Zosyn for now.  Monitoring for any complications.  Remains on VT prophylaxis.  Holding oral medications at this time.  Continue with ambulation and use of incentive spirometer.  Rest of care per primary service.    DVT Prophylaxis: Lovenox  Code Status: Full  Diet: N.p.o.  Discharge Plan: Per surgical service    Lanny Navarro DO  09/03/21  15:11 EDT    Dictated utilizing Dragon dictation.

## 2021-09-03 NOTE — PROGRESS NOTES
LOS: 1 day   Patient Care Team:  Ashley Grier MD as PCP - General (Internal Medicine)  Brissa Mello MD as Consulting Physician (Obstetrics and Gynecology)  Batool Larios MD as Consulting Physician (Obstetrics and Gynecology)      Chief Complaint: Patient states she feels much better      Interval History:     Patient Complaints: See Above    History taken from: patient RN    Vital Signs  Temp:  [97 °F (36.1 °C)-98.1 °F (36.7 °C)] 98 °F (36.7 °C)  Heart Rate:  [70-95] 85  Resp:  [14-20] 18  BP: ()/(50-82) 122/56    Physical Exam:     General Appearance:    Alert, cooperative, in no acute distress   Head:    Normocephalic, without obvious abnormality, atraumatic   Lungs:     Clear to auscultation,respirations regular, even and                  unlabored    Heart:    Regular rhythm and normal rate, normal S1 and S2, no            murmur, no gallop, no rub, no click   Abdomen:     Normal bowel sounds, no masses, no organomegaly, soft        non-tender, non-distended, no guarding, no rebound                tenderness   Extremities:   Moves all extremities well, no edema, no cyanosis, no             redness   Pulses:   Pulses palpable and equal bilaterally   Skin:   No bleeding, bruising or rash        Results Review:       Lab Results (last 24 hours)     Procedure Component Value Units Date/Time    Comprehensive Metabolic Panel [[id number]]  (Abnormal) Collected: 09/03/21 0655    Specimen: Blood Updated: 09/03/21 0816     Glucose 122 mg/dL      BUN 12 mg/dL      Creatinine 0.71 mg/dL      Sodium 141 mmol/L      Potassium 3.4 mmol/L      Chloride 109 mmol/L      CO2 25.4 mmol/L      Calcium 8.2 mg/dL      Total Protein 4.9 g/dL      Albumin 3.00 g/dL      ALT (SGPT) 18 U/L      AST (SGOT) 20 U/L      Alkaline Phosphatase 41 U/L      Total Bilirubin 0.6 mg/dL      eGFR Non African Amer 82 mL/min/1.73      Globulin 1.9 gm/dL      A/G Ratio 1.6 g/dL      BUN/Creatinine Ratio 16.9     Anion Gap  6.6 mmol/L     Narrative:      GFR Normal >60  Chronic Kidney Disease <60  Kidney Failure <15      CBC & Differential [[id number]]  (Abnormal) Collected: 09/03/21 0655    Specimen: Blood Updated: 09/03/21 0746    Narrative:      The following orders were created for panel order CBC & Differential.  Procedure                               Abnormality         Status                     ---------                               -----------         ------                     CBC Auto Differential[[id number]]        Abnormal            Final result                 Please view results for these tests on the individual orders.    CBC Auto Differential [[id number]]  (Abnormal) Collected: 09/03/21 0655    Specimen: Blood Updated: 09/03/21 0746     WBC 15.30 10*3/mm3      RBC 3.44 10*6/mm3      Hemoglobin 10.2 g/dL      Hematocrit 31.2 %      MCV 90.7 fL      MCH 29.7 pg      MCHC 32.7 g/dL      RDW 13.6 %      RDW-SD 45.2 fl      MPV 11.6 fL      Platelets 165 10*3/mm3      Neutrophil % 82.5 %      Lymphocyte % 9.7 %      Monocyte % 7.1 %      Eosinophil % 0.1 %      Basophil % 0.2 %      Immature Grans % 0.4 %      Neutrophils, Absolute 12.64 10*3/mm3      Lymphocytes, Absolute 1.48 10*3/mm3      Monocytes, Absolute 1.08 10*3/mm3      Eosinophils, Absolute 0.01 10*3/mm3      Basophils, Absolute 0.03 10*3/mm3      Immature Grans, Absolute 0.06 10*3/mm3      nRBC 0.0 /100 WBC               Assessment/Plan       SBO (small bowel obstruction) (CMS/HCC)    Small bowel obstruction (CMS/HCC)      Stable white female is doing well 1 day status post exploratory laparotomy with small bowel resection for volvulus resulting in gangrene.  I had a clear detailed and extensive discussion with the patient in the hospital today regarding the findings at the time of operative intervention.  I have discussed the situation with the nursing staff.  We will take her Saldana out today and possibly remove the NG tube later today or tomorrow.  I would  like to leave her n.p.o. with no medications per mouth.      Sunny Sun MD  09/03/21  08:41 EDT

## 2021-09-03 NOTE — PROGRESS NOTES
Patient: Andie Monreal  Procedure(s):  LAPAROTOMY EXPLORATORY WITH SMALL BOWEL RESECTION AND APPENDECTOMY  Anesthesia type: general    Patient location: [facility] Surgical Floor  Last vitals:   Vitals:    09/03/21 0700   BP: 115/56   Pulse: 76   Resp: 10   Temp: 98.6 °F (37 °C)   SpO2:      Level of consciousness: awake, alert and oriented    Post-anesthesia pain: adequate analgesia  Airway patency: patent  Respiratory: unassisted  Cardiovascular: stable and blood pressure at baseline  Hydration: euvolemic    Anesthetic complications: no

## 2021-09-04 LAB
ANION GAP SERPL CALCULATED.3IONS-SCNC: 7.4 MMOL/L (ref 5–15)
BASOPHILS # BLD AUTO: 0.03 10*3/MM3 (ref 0–0.2)
BASOPHILS NFR BLD AUTO: 0.2 % (ref 0–1.5)
BUN SERPL-MCNC: 8 MG/DL (ref 8–23)
BUN/CREAT SERPL: 10.8 (ref 7–25)
CALCIUM SPEC-SCNC: 8.8 MG/DL (ref 8.6–10.5)
CHLORIDE SERPL-SCNC: 107 MMOL/L (ref 98–107)
CO2 SERPL-SCNC: 27.6 MMOL/L (ref 22–29)
CREAT SERPL-MCNC: 0.74 MG/DL (ref 0.57–1)
DEPRECATED RDW RBC AUTO: 45.1 FL (ref 37–54)
EOSINOPHIL # BLD AUTO: 0.06 10*3/MM3 (ref 0–0.4)
EOSINOPHIL NFR BLD AUTO: 0.5 % (ref 0.3–6.2)
ERYTHROCYTE [DISTWIDTH] IN BLOOD BY AUTOMATED COUNT: 13.4 % (ref 12.3–15.4)
GFR SERPL CREATININE-BSD FRML MDRD: 78 ML/MIN/1.73
GLUCOSE SERPL-MCNC: 112 MG/DL (ref 65–99)
HCT VFR BLD AUTO: 31.8 % (ref 34–46.6)
HGB BLD-MCNC: 10.3 G/DL (ref 12–15.9)
IMM GRANULOCYTES # BLD AUTO: 0.06 10*3/MM3 (ref 0–0.05)
IMM GRANULOCYTES NFR BLD AUTO: 0.5 % (ref 0–0.5)
LYMPHOCYTES # BLD AUTO: 1.34 10*3/MM3 (ref 0.7–3.1)
LYMPHOCYTES NFR BLD AUTO: 10.3 % (ref 19.6–45.3)
MCH RBC QN AUTO: 29.6 PG (ref 26.6–33)
MCHC RBC AUTO-ENTMCNC: 32.4 G/DL (ref 31.5–35.7)
MCV RBC AUTO: 91.4 FL (ref 79–97)
MONOCYTES # BLD AUTO: 0.84 10*3/MM3 (ref 0.1–0.9)
MONOCYTES NFR BLD AUTO: 6.5 % (ref 5–12)
NEUTROPHILS NFR BLD AUTO: 10.69 10*3/MM3 (ref 1.7–7)
NEUTROPHILS NFR BLD AUTO: 82 % (ref 42.7–76)
NRBC BLD AUTO-RTO: 0 /100 WBC (ref 0–0.2)
PLATELET # BLD AUTO: 178 10*3/MM3 (ref 140–450)
PMV BLD AUTO: 11.7 FL (ref 6–12)
POTASSIUM SERPL-SCNC: 3.5 MMOL/L (ref 3.5–5.2)
RBC # BLD AUTO: 3.48 10*6/MM3 (ref 3.77–5.28)
SODIUM SERPL-SCNC: 142 MMOL/L (ref 136–145)
WBC # BLD AUTO: 13.02 10*3/MM3 (ref 3.4–10.8)

## 2021-09-04 PROCEDURE — 25010000002 ENOXAPARIN PER 10 MG: Performed by: FAMILY MEDICINE

## 2021-09-04 PROCEDURE — 80048 BASIC METABOLIC PNL TOTAL CA: CPT | Performed by: SURGERY

## 2021-09-04 PROCEDURE — 25010000003 LIDOCAINE 1 % SOLUTION: Performed by: SURGERY

## 2021-09-04 PROCEDURE — 25010000002 PIPERACILLIN SOD-TAZOBACTAM PER 1 G: Performed by: SURGERY

## 2021-09-04 PROCEDURE — 25010000002 HYDROMORPHONE 1 MG/ML SOLUTION: Performed by: SURGERY

## 2021-09-04 PROCEDURE — 99232 SBSQ HOSP IP/OBS MODERATE 35: CPT | Performed by: FAMILY MEDICINE

## 2021-09-04 PROCEDURE — 85025 COMPLETE CBC W/AUTO DIFF WBC: CPT | Performed by: FAMILY MEDICINE

## 2021-09-04 PROCEDURE — 99024 POSTOP FOLLOW-UP VISIT: CPT | Performed by: SURGERY

## 2021-09-04 RX ORDER — LIDOCAINE HYDROCHLORIDE 10 MG/ML
INJECTION, SOLUTION INFILTRATION; PERINEURAL
Status: DISPENSED
Start: 2021-09-04 | End: 2021-09-04

## 2021-09-04 RX ORDER — LIDOCAINE HYDROCHLORIDE 10 MG/ML
20 INJECTION, SOLUTION INFILTRATION; PERINEURAL ONCE
Status: COMPLETED | OUTPATIENT
Start: 2021-09-04 | End: 2021-09-04

## 2021-09-04 RX ADMIN — POTASSIUM CHLORIDE, DEXTROSE MONOHYDRATE AND SODIUM CHLORIDE 100 ML/HR: 150; 5; 450 INJECTION, SOLUTION INTRAVENOUS at 02:40

## 2021-09-04 RX ADMIN — HYDROMORPHONE HYDROCHLORIDE 1 MG: 1 INJECTION, SOLUTION INTRAMUSCULAR; INTRAVENOUS; SUBCUTANEOUS at 22:52

## 2021-09-04 RX ADMIN — TAZOBACTAM SODIUM AND PIPERACILLIN SODIUM 3.38 G: 375; 3 INJECTION, SOLUTION INTRAVENOUS at 17:24

## 2021-09-04 RX ADMIN — TAZOBACTAM SODIUM AND PIPERACILLIN SODIUM 3.38 G: 375; 3 INJECTION, SOLUTION INTRAVENOUS at 23:54

## 2021-09-04 RX ADMIN — SODIUM CHLORIDE, PRESERVATIVE FREE 3 ML: 5 INJECTION INTRAVENOUS at 09:49

## 2021-09-04 RX ADMIN — TAZOBACTAM SODIUM AND PIPERACILLIN SODIUM 3.38 G: 375; 3 INJECTION, SOLUTION INTRAVENOUS at 06:01

## 2021-09-04 RX ADMIN — LIDOCAINE HYDROCHLORIDE 20 ML: 10 INJECTION, SOLUTION INFILTRATION; PERINEURAL at 13:03

## 2021-09-04 RX ADMIN — PRAVASTATIN SODIUM 20 MG: 20 TABLET ORAL at 20:56

## 2021-09-04 RX ADMIN — POTASSIUM CHLORIDE, DEXTROSE MONOHYDRATE AND SODIUM CHLORIDE 100 ML/HR: 150; 5; 450 INJECTION, SOLUTION INTRAVENOUS at 14:24

## 2021-09-04 RX ADMIN — HYDROMORPHONE HYDROCHLORIDE 1 MG: 1 INJECTION, SOLUTION INTRAMUSCULAR; INTRAVENOUS; SUBCUTANEOUS at 14:11

## 2021-09-04 RX ADMIN — ENOXAPARIN SODIUM 40 MG: 40 INJECTION SUBCUTANEOUS at 20:56

## 2021-09-04 NOTE — PLAN OF CARE
Goal Outcome Evaluation:               Patient was leaking blood from top of incision this morning, dr Monae was notified and shortly after came to room and inserted stitches, bleeding under control and stopped now, dressing changed, staples intact, patient ambulating to bathroom several times today, pain managed well with IV dilaudid, ice chips only, no PO meds per MD Sun, ng tube intact, 200 mL out so far today, [facility], notify MD for issues or concerns

## 2021-09-04 NOTE — PROGRESS NOTES
[facility]    Name:  Andie Monreal   Age:  65 y.o.  Sex:  female  :  1955  MRN:  [id number]   Visit Number:  [id number]  Admission Date:  2021  Date Of Service:  21  Primary Care Physician:  Ashley Grier MD     LOS: 2 days :    Chief Complaint:      Follow-up on abdominal pain    Subjective:  Patient seen again today.  No acute changes overnight.  She did note nursing noted some bloody drainage from her wound with dressing change this morning.  Discussed we will need to talk with general surgery about that.  I did tell her her hemoglobin was stable.  She is wanting to get up and move around.  She has voided multiple times since Saldana catheter was discontinued.    Hospital Course:    65-year-old female who presented emergency room with acute onset of abdominal pain, with work-up concerning for small bowel obstruction and volvulus with ischemic bowel.  She was admitted to the general surgery service and taken emergently for exploratory laparotomy by Dr. Sun on 2021 with evidence of reduction of volvulus and small bowel resection of the mid jejunum, with appendectomy.  We were asked to see postoperatively for medical management.  Suspect postop ileus.    Review of Systems:     All systems were reviewed and negative except as mentioned in subjective, assessment and plan.    Vital Signs:    Temp:  [97.9 °F (36.6 °C)-99.4 °F (37.4 °C)] 97.9 °F (36.6 °C)  Heart Rate:  [76-92] 80  Resp:  [16-18] 18  BP: (111-139)/(59-73) 123/64    Intake and output:    I/O last 3 completed shifts:  In: 2978 [I.V.:2878; IV Piggyback:100]  Out: 3350 [Urine:2650; Emesis/NG output:700]  No intake/output data recorded.    Physical Examination:    General Appearance:  Alert and cooperative.  No acute distress   Head:  Atraumatic and normocephalic.   Eyes: Conjunctivae and sclerae normal, no icterus. No pallor.   Throat: No oral lesions, no thrush, oral mucosa  moist.   Neck: Supple, trachea midline, no thyromegaly.   Lungs:   Breath sounds heard bilaterally equally.  No crackles or wheezing. No Pleural rub or bronchial breathing.   Heart:  Normal S1 and S2, no murmur, no gallop, no rub. No JVD.   Abdomen:    Hypoactive bowel sounds.  Surgical site incision was not evaluated as dressing change just been completed.   Extremities: Supple, no edema, no cyanosis, no clubbing.   Skin: No bleeding or rash.   Neurologic: Alert and oriented x 3. No facial asymmetry. Moves all four limbs. No tremors.      Laboratory results:    Results from last 7 days   Lab Units 09/04/21  0700 09/03/21  0655 09/01/21  2350   SODIUM mmol/L 142 141 142   POTASSIUM mmol/L 3.5 3.4* 3.2*   CHLORIDE mmol/L 107 109* 101   CO2 mmol/L 27.6 25.4 24.5   BUN mg/dL 8 12 19   CREATININE mg/dL 0.74 0.71 0.84   CALCIUM mg/dL 8.8 8.2* 10.3   BILIRUBIN mg/dL  --  0.6 0.8   ALK PHOS U/L  --  41 74   ALT (SGPT) U/L  --  18 32   AST (SGOT) U/L  --  20 30   GLUCOSE mg/dL 112* 122* 184*     Results from last 7 days   Lab Units 09/04/21  0700 09/03/21  0655 09/01/21  2350   WBC 10*3/mm3 13.02* 15.30* 13.22*   HEMOGLOBIN g/dL 10.3* 10.2* 14.3   HEMATOCRIT % 31.8* 31.2* 42.2   PLATELETS 10*3/mm3 178 165 223                     I have reviewed the patient's laboratory results.    Radiology results:    No radiology results from the last 24 hrs  I have reviewed the patient's radiology reports.    Medication Review:     I have reviewed the patient's active and prn medications.     Problem List:      SBO (small bowel obstruction) (CMS/HCC)    Small bowel obstruction (CMS/HCC)      Assessment:    1.  Small bowel obstruction, concern for volvulus with ischemic bowel, status post exploratory laparotomy, postop day 3  2.  Status post appendectomy  3.  Hypertension  4.  Dyslipidemia  5.  Anxiety  6.  Arthritis  7.  Suspected postoperative ileus    Plan:    Patient without latest thus far.  Hypoactive bowel sounds.  Labs are stable  overall, white count trending down to normal range nearly.  Remains afebrile.  Continue use of incentive spirometer.  Continue current antibiotic regimen.  NG tube/dietary recommendations and surgical site wound evaluation by primary service, general surgery.  We will continue to follow.    DVT Prophylaxis: Lovenox  Code Status: Full  Diet: N.p.o.  Discharge Plan: Per surgical service    Lanny Navarro DO  09/04/21  11:57 EDT    Dictated utilizing Dragon dictation.

## 2021-09-04 NOTE — PLAN OF CARE
Goal Outcome Evaluation:  Plan of Care Reviewed With: patient        Progress: improving  Outcome Summary: VSS.  Pt ambulated to the bathroom several times during shift.  Pt c/o pain that was controlled with PRN meds.  No change in pt condition to report.  Will continue to monitor.

## 2021-09-04 NOTE — PROGRESS NOTES
LOS: 2 days   Patient Care Team:  Ashley Grier MD as PCP - General (Internal Medicine)  Brissa Mello MD as Consulting Physician (Obstetrics and Gynecology)  Batool Larios MD as Consulting Physician (Obstetrics and Gynecology)           HPI: I was notified by the nursing staff that she had some bleeding from her wound.  Patient otherwise has no complaints and is doing well.  Pain is under control.  No nausea or vomiting.  No flatus.    ROS:    Vital Signs  Temp:  [97.9 °F (36.6 °C)-99.4 °F (37.4 °C)] 97.9 °F (36.6 °C)  Heart Rate:  [76-92] 80  Resp:  [16-18] 18  BP: (111-139)/(59-73) 123/64    Physical Exam:     General Appearance:  Alert and cooperative, not in any acute distress.   Respiratory/Lungs:   Breath sounds heard bilaterally equally.  No crackles or wheezing. No Pleural rub or bronchial breathing. Normal respiratory effort.    Cardiovascular/Heart:  Normal S1 and S2, no murmur. No edema   GI/Abdomen:    Soft and nondistended.  No significant tenderness.  No significant bowel sounds appreciated.  No wound infection appreciated.  In the upper abdominal incision there is some bleeding from the wound that appears to be involving the skin.  I anesthetized that area after it was prepped and simple nylon sutures were placed in the skin wound.  No bleeding was evident subsequent to this.                 Results Review:       Lab Results (last 24 hours)     Procedure Component Value Units Date/Time    Basic Metabolic Panel [[id number]]  (Abnormal) Collected: 09/04/21 0700    Specimen: Blood Updated: 09/04/21 0819     Glucose 112 mg/dL      BUN 8 mg/dL      Creatinine 0.74 mg/dL      Sodium 142 mmol/L      Potassium 3.5 mmol/L      Chloride 107 mmol/L      CO2 27.6 mmol/L      Calcium 8.8 mg/dL      eGFR Non African Amer 78 mL/min/1.73      BUN/Creatinine Ratio 10.8     Anion Gap 7.4 mmol/L     Narrative:      GFR Normal >60  Chronic Kidney Disease <60  Kidney Failure <15      CBC &  Differential [[id number]]  (Abnormal) Collected: 09/04/21 0700    Specimen: Blood Updated: 09/04/21 0757    Narrative:      The following orders were created for panel order CBC & Differential.  Procedure                               Abnormality         Status                     ---------                               -----------         ------                     CBC Auto Differential[[id number]]        Abnormal            Final result                 Please view results for these tests on the individual orders.    CBC Auto Differential [[id number]]  (Abnormal) Collected: 09/04/21 0700    Specimen: Blood Updated: 09/04/21 0757     WBC 13.02 10*3/mm3      RBC 3.48 10*6/mm3      Hemoglobin 10.3 g/dL      Hematocrit 31.8 %      MCV 91.4 fL      MCH 29.6 pg      MCHC 32.4 g/dL      RDW 13.4 %      RDW-SD 45.1 fl      MPV 11.7 fL      Platelets 178 10*3/mm3      Neutrophil % 82.0 %      Lymphocyte % 10.3 %      Monocyte % 6.5 %      Eosinophil % 0.5 %      Basophil % 0.2 %      Immature Grans % 0.5 %      Neutrophils, Absolute 10.69 10*3/mm3      Lymphocytes, Absolute 1.34 10*3/mm3      Monocytes, Absolute 0.84 10*3/mm3      Eosinophils, Absolute 0.06 10*3/mm3      Basophils, Absolute 0.03 10*3/mm3      Immature Grans, Absolute 0.06 10*3/mm3      nRBC 0.0 /100 WBC               Assessment/Plan       SBO (small bowel obstruction) (CMS/HCC)    Small bowel obstruction (CMS/HCC)    Patient status post small bowel resection.  She is doing well.  Awaiting bowel function return.  I will keep the NG tube for now.    Bari Monae MD  09/04/21  13:13 EDT

## 2021-09-05 PROBLEM — E78.5 HYPERLIPIDEMIA: Chronic | Status: ACTIVE | Noted: 2021-09-05

## 2021-09-05 PROBLEM — I10 ESSENTIAL HYPERTENSION: Chronic | Status: ACTIVE | Noted: 2021-09-05

## 2021-09-05 LAB
ANION GAP SERPL CALCULATED.3IONS-SCNC: 8.6 MMOL/L (ref 5–15)
BASOPHILS # BLD AUTO: 0.03 10*3/MM3 (ref 0–0.2)
BASOPHILS NFR BLD AUTO: 0.4 % (ref 0–1.5)
BUN SERPL-MCNC: 7 MG/DL (ref 8–23)
BUN/CREAT SERPL: 9.7 (ref 7–25)
CALCIUM SPEC-SCNC: 8.7 MG/DL (ref 8.6–10.5)
CHLORIDE SERPL-SCNC: 106 MMOL/L (ref 98–107)
CO2 SERPL-SCNC: 24.4 MMOL/L (ref 22–29)
CREAT SERPL-MCNC: 0.72 MG/DL (ref 0.57–1)
DEPRECATED RDW RBC AUTO: 43.4 FL (ref 37–54)
EOSINOPHIL # BLD AUTO: 0.37 10*3/MM3 (ref 0–0.4)
EOSINOPHIL NFR BLD AUTO: 4.7 % (ref 0.3–6.2)
ERYTHROCYTE [DISTWIDTH] IN BLOOD BY AUTOMATED COUNT: 13.1 % (ref 12.3–15.4)
GFR SERPL CREATININE-BSD FRML MDRD: 81 ML/MIN/1.73
GLUCOSE SERPL-MCNC: 123 MG/DL (ref 65–99)
HCT VFR BLD AUTO: 30.5 % (ref 34–46.6)
HGB BLD-MCNC: 9.9 G/DL (ref 12–15.9)
IMM GRANULOCYTES # BLD AUTO: 0.03 10*3/MM3 (ref 0–0.05)
IMM GRANULOCYTES NFR BLD AUTO: 0.4 % (ref 0–0.5)
LYMPHOCYTES # BLD AUTO: 1.35 10*3/MM3 (ref 0.7–3.1)
LYMPHOCYTES NFR BLD AUTO: 17.3 % (ref 19.6–45.3)
MCH RBC QN AUTO: 29.4 PG (ref 26.6–33)
MCHC RBC AUTO-ENTMCNC: 32.5 G/DL (ref 31.5–35.7)
MCV RBC AUTO: 90.5 FL (ref 79–97)
MONOCYTES # BLD AUTO: 0.55 10*3/MM3 (ref 0.1–0.9)
MONOCYTES NFR BLD AUTO: 7.1 % (ref 5–12)
NEUTROPHILS NFR BLD AUTO: 5.47 10*3/MM3 (ref 1.7–7)
NEUTROPHILS NFR BLD AUTO: 70.1 % (ref 42.7–76)
NRBC BLD AUTO-RTO: 0 /100 WBC (ref 0–0.2)
PLATELET # BLD AUTO: 167 10*3/MM3 (ref 140–450)
PMV BLD AUTO: 11.4 FL (ref 6–12)
POTASSIUM SERPL-SCNC: 3.7 MMOL/L (ref 3.5–5.2)
RBC # BLD AUTO: 3.37 10*6/MM3 (ref 3.77–5.28)
SODIUM SERPL-SCNC: 139 MMOL/L (ref 136–145)
WBC # BLD AUTO: 7.8 10*3/MM3 (ref 3.4–10.8)

## 2021-09-05 PROCEDURE — 85025 COMPLETE CBC W/AUTO DIFF WBC: CPT | Performed by: FAMILY MEDICINE

## 2021-09-05 PROCEDURE — 25010000002 ENOXAPARIN PER 10 MG: Performed by: FAMILY MEDICINE

## 2021-09-05 PROCEDURE — 94799 UNLISTED PULMONARY SVC/PX: CPT

## 2021-09-05 PROCEDURE — 99024 POSTOP FOLLOW-UP VISIT: CPT | Performed by: SURGERY

## 2021-09-05 PROCEDURE — 25010000002 PIPERACILLIN SOD-TAZOBACTAM PER 1 G: Performed by: SURGERY

## 2021-09-05 PROCEDURE — 80048 BASIC METABOLIC PNL TOTAL CA: CPT | Performed by: FAMILY MEDICINE

## 2021-09-05 PROCEDURE — 99232 SBSQ HOSP IP/OBS MODERATE 35: CPT | Performed by: FAMILY MEDICINE

## 2021-09-05 RX ADMIN — SODIUM CHLORIDE, PRESERVATIVE FREE 3 ML: 5 INJECTION INTRAVENOUS at 21:40

## 2021-09-05 RX ADMIN — PRAVASTATIN SODIUM 20 MG: 20 TABLET ORAL at 21:24

## 2021-09-05 RX ADMIN — TAZOBACTAM SODIUM AND PIPERACILLIN SODIUM 3.38 G: 375; 3 INJECTION, SOLUTION INTRAVENOUS at 06:38

## 2021-09-05 RX ADMIN — ENOXAPARIN SODIUM 40 MG: 40 INJECTION SUBCUTANEOUS at 21:24

## 2021-09-05 RX ADMIN — TAZOBACTAM SODIUM AND PIPERACILLIN SODIUM 3.38 G: 375; 3 INJECTION, SOLUTION INTRAVENOUS at 16:55

## 2021-09-05 RX ADMIN — TAZOBACTAM SODIUM AND PIPERACILLIN SODIUM 3.38 G: 375; 3 INJECTION, SOLUTION INTRAVENOUS at 23:02

## 2021-09-05 RX ADMIN — SODIUM CHLORIDE, PRESERVATIVE FREE 3 ML: 5 INJECTION INTRAVENOUS at 09:00

## 2021-09-05 NOTE — PROGRESS NOTES
LOS: 3 days   Patient Care Team:  Ashley Grier MD as PCP - General (Internal Medicine)  Brissa Mello MD as Consulting Physician (Obstetrics and Gynecology)  Batool Larios MD as Consulting Physician (Obstetrics and Gynecology)           HPI: Patient states that she is doing well.  Having no complaints currently.  No flatus.    ROS:    Vital Signs  Temp:  [97.9 °F (36.6 °C)-99.2 °F (37.3 °C)] 97.9 °F (36.6 °C)  Heart Rate:  [70-87] 75  Resp:  [18] 18  BP: (109-152)/(54-97) 122/54    Physical Exam:     General Appearance:  Alert and cooperative, not in any acute distress.   Cardiovascular/Heart:  Normal S1 and S2,   GI/Abdomen:    Soft and nondistended.  A few bowel sounds are present.                  Results Review:       Lab Results (last 24 hours)     Procedure Component Value Units Date/Time    Basic Metabolic Panel [[id number]]  (Abnormal) Collected: 09/05/21 0831    Specimen: Blood Updated: 09/05/21 0904     Glucose 123 mg/dL      BUN 7 mg/dL      Creatinine 0.72 mg/dL      Sodium 139 mmol/L      Potassium 3.7 mmol/L      Chloride 106 mmol/L      CO2 24.4 mmol/L      Calcium 8.7 mg/dL      eGFR Non African Amer 81 mL/min/1.73      BUN/Creatinine Ratio 9.7     Anion Gap 8.6 mmol/L     Narrative:      GFR Normal >60  Chronic Kidney Disease <60  Kidney Failure <15      CBC & Differential [[id number]]  (Abnormal) Collected: 09/05/21 0831    Specimen: Blood Updated: 09/05/21 0849    Narrative:      The following orders were created for panel order CBC & Differential.  Procedure                               Abnormality         Status                     ---------                               -----------         ------                     CBC Auto Differential[[id number]]        Abnormal            Final result                 Please view results for these tests on the individual orders.    CBC Auto Differential [[id number]]  (Abnormal) Collected: 09/05/21 0831    Specimen: Blood Updated:  09/05/21 0849     WBC 7.80 10*3/mm3      RBC 3.37 10*6/mm3      Hemoglobin 9.9 g/dL      Hematocrit 30.5 %      MCV 90.5 fL      MCH 29.4 pg      MCHC 32.5 g/dL      RDW 13.1 %      RDW-SD 43.4 fl      MPV 11.4 fL      Platelets 167 10*3/mm3      Neutrophil % 70.1 %      Lymphocyte % 17.3 %      Monocyte % 7.1 %      Eosinophil % 4.7 %      Basophil % 0.4 %      Immature Grans % 0.4 %      Neutrophils, Absolute 5.47 10*3/mm3      Lymphocytes, Absolute 1.35 10*3/mm3      Monocytes, Absolute 0.55 10*3/mm3      Eosinophils, Absolute 0.37 10*3/mm3      Basophils, Absolute 0.03 10*3/mm3      Immature Grans, Absolute 0.03 10*3/mm3      nRBC 0.0 /100 WBC               Assessment/Plan       SBO (small bowel obstruction) (CMS/HCC)    Small bowel obstruction (CMS/HCC)    Stable postoperatively after a small bowel resection.  Currently doing well.  Continue to ambulate.  Will DC NG tube.  Still waiting for improved bowel function.    Bari Monae MD  09/05/21  14:30 EDT

## 2021-09-05 NOTE — PLAN OF CARE
Goal Outcome Evaluation:  Plan of Care Reviewed With: patient        Progress: improving  Outcome Summary: VSS; continue ng lws; encourage ambulation and IS

## 2021-09-05 NOTE — PLAN OF CARE
Goal Outcome Evaluation:               Patient NG tube clamped per Dr Monae order and patient is tolerating for several hours now with no c/o N/V, hypoactive bowel sounds, still no flatus, will remove NG tube this evening per Dr Monae order if pt still not having c/o of N/V, pt ambulating frequently, tolerating ice chips, meds given as ordered by macario SORTO, notify MD for issues or concerns

## 2021-09-05 NOTE — PROGRESS NOTES
[facility]    Name:  Andie Monreal   Age:  65 y.o.  Sex:  female  :  1955  MRN:  [id number]   Visit Number:  [id number]  Admission Date:  2021  Date Of Service:  21  Primary Care Physician:  Ashley Grier MD     LOS: 3 days :    Chief Complaint:      Follow-up on abdominal pain    Subjective:    Seen again today, no acute changes. Discussed ambulation today, she wants to do. No fevers or chills. Minimal pain.     Hospital Course:    65-year-old female who presented emergency room with acute onset of abdominal pain, with work-up concerning for small bowel obstruction and volvulus with ischemic bowel.  She was admitted to the general surgery service and taken emergently for exploratory laparotomy by Dr. Sun on 2021 with evidence of reduction of volvulus and small bowel resection of the mid jejunum, with appendectomy.  We were asked to see postoperatively for medical management.  Suspect postop ileus. Bowel sounds improved on 2021    Review of Systems:     All systems were reviewed and negative except as mentioned in subjective, assessment and plan.    Vital Signs:    Temp:  [97.9 °F (36.6 °C)-99.2 °F (37.3 °C)] 97.9 °F (36.6 °C)  Heart Rate:  [70-87] 75  Resp:  [18] 18  BP: (109-152)/(54-97) 122/54    Intake and output:    I/O last 3 completed shifts:  In: 1278 [I.V.:1228; IV Piggyback:50]  Out: 4900 [Urine:4350; Emesis/NG output:550]  No intake/output data recorded.    Physical Examination:    General Appearance:  Alert and cooperative.  No acute distress   Head:  Atraumatic and normocephalic.   Eyes: Conjunctivae and sclerae normal, no icterus. No pallor.   Throat: No oral lesions, no thrush, oral mucosa moist.   Neck: Supple, trachea midline, no thyromegaly.   Lungs:   Breath sounds heard bilaterally equally.  No crackles or wheezing. No Pleural rub or bronchial breathing.   Heart:  Normal S1 and S2, no murmur, no gallop, no  rub. No JVD.   Abdomen:    Active bowel sounds today. Did not look at incision.    Extremities: Supple, no edema, no cyanosis, no clubbing.   Skin: No bleeding or rash.   Neurologic: Alert and oriented x 3. No facial asymmetry. Moves all four limbs. No tremors.      Laboratory results:    Results from last 7 days   Lab Units 09/05/21  0831 09/04/21  0700 09/03/21  0655 09/01/21  2350 09/01/21  2350   SODIUM mmol/L 139 142 141   < > 142   POTASSIUM mmol/L 3.7 3.5 3.4*   < > 3.2*   CHLORIDE mmol/L 106 107 109*   < > 101   CO2 mmol/L 24.4 27.6 25.4   < > 24.5   BUN mg/dL 7* 8 12   < > 19   CREATININE mg/dL 0.72 0.74 0.71   < > 0.84   CALCIUM mg/dL 8.7 8.8 8.2*   < > 10.3   BILIRUBIN mg/dL  --   --  0.6  --  0.8   ALK PHOS U/L  --   --  41  --  74   ALT (SGPT) U/L  --   --  18  --  32   AST (SGOT) U/L  --   --  20  --  30   GLUCOSE mg/dL 123* 112* 122*   < > 184*    < > = values in this interval not displayed.     Results from last 7 days   Lab Units 09/05/21  0831 09/04/21  0700 09/03/21  0655   WBC 10*3/mm3 7.80 13.02* 15.30*   HEMOGLOBIN g/dL 9.9* 10.3* 10.2*   HEMATOCRIT % 30.5* 31.8* 31.2*   PLATELETS 10*3/mm3 167 178 165                     I have reviewed the patient's laboratory results.    Radiology results:    No radiology results from the last 24 hrs  I have reviewed the patient's radiology reports.    Medication Review:     I have reviewed the patient's active and prn medications.     Problem List:      SBO (small bowel obstruction) (CMS/HCC)    Small bowel obstruction (CMS/HCC)      Assessment:    1.  Small bowel obstruction, concern for volvulus with ischemic bowel, status post exploratory laparotomy, postop day 3  2.  Status post appendectomy  3.  Hypertension  4.  Dyslipidemia  5.  Anxiety  6.  Arthritis  7.  Suspected postoperative ileus    Plan:    Doing well thus far. Labs have been stable. Believe ileus is improving. Dr. Monae to round today, likely continue NG tube today and dietary orders per his  recommendations. Continue antibiotic today, can likely dc thereafter. Continue ambulation and incentive spirometry.     DVT Prophylaxis: Lovenox  Code Status: Full  Diet: N.p.o.  Discharge Plan: Per surgical service    Lanny Navarro DO  09/05/21  14:28 EDT    Dictated utilizing Dragon dictation.

## 2021-09-06 LAB
ANION GAP SERPL CALCULATED.3IONS-SCNC: 12.9 MMOL/L (ref 5–15)
BASOPHILS # BLD AUTO: 0.05 10*3/MM3 (ref 0–0.2)
BASOPHILS NFR BLD AUTO: 0.6 % (ref 0–1.5)
BUN SERPL-MCNC: 12 MG/DL (ref 8–23)
BUN/CREAT SERPL: 16.7 (ref 7–25)
CALCIUM SPEC-SCNC: 8.9 MG/DL (ref 8.6–10.5)
CHLORIDE SERPL-SCNC: 104 MMOL/L (ref 98–107)
CO2 SERPL-SCNC: 22.1 MMOL/L (ref 22–29)
CREAT SERPL-MCNC: 0.72 MG/DL (ref 0.57–1)
DEPRECATED RDW RBC AUTO: 42.5 FL (ref 37–54)
EOSINOPHIL # BLD AUTO: 0.39 10*3/MM3 (ref 0–0.4)
EOSINOPHIL NFR BLD AUTO: 5 % (ref 0.3–6.2)
ERYTHROCYTE [DISTWIDTH] IN BLOOD BY AUTOMATED COUNT: 13.1 % (ref 12.3–15.4)
GFR SERPL CREATININE-BSD FRML MDRD: 81 ML/MIN/1.73
GLUCOSE SERPL-MCNC: 93 MG/DL (ref 65–99)
HCT VFR BLD AUTO: 30.4 % (ref 34–46.6)
HGB BLD-MCNC: 10.2 G/DL (ref 12–15.9)
IMM GRANULOCYTES # BLD AUTO: 0.04 10*3/MM3 (ref 0–0.05)
IMM GRANULOCYTES NFR BLD AUTO: 0.5 % (ref 0–0.5)
LAB AP CASE REPORT: NORMAL
LYMPHOCYTES # BLD AUTO: 1.51 10*3/MM3 (ref 0.7–3.1)
LYMPHOCYTES NFR BLD AUTO: 19.3 % (ref 19.6–45.3)
MCH RBC QN AUTO: 29.7 PG (ref 26.6–33)
MCHC RBC AUTO-ENTMCNC: 33.6 G/DL (ref 31.5–35.7)
MCV RBC AUTO: 88.4 FL (ref 79–97)
MONOCYTES # BLD AUTO: 0.59 10*3/MM3 (ref 0.1–0.9)
MONOCYTES NFR BLD AUTO: 7.5 % (ref 5–12)
NEUTROPHILS NFR BLD AUTO: 5.25 10*3/MM3 (ref 1.7–7)
NEUTROPHILS NFR BLD AUTO: 67.1 % (ref 42.7–76)
NRBC BLD AUTO-RTO: 0 /100 WBC (ref 0–0.2)
PATH REPORT.FINAL DX SPEC: NORMAL
PLATELET # BLD AUTO: 195 10*3/MM3 (ref 140–450)
PMV BLD AUTO: 11.3 FL (ref 6–12)
POTASSIUM SERPL-SCNC: 3.2 MMOL/L (ref 3.5–5.2)
RBC # BLD AUTO: 3.44 10*6/MM3 (ref 3.77–5.28)
SODIUM SERPL-SCNC: 139 MMOL/L (ref 136–145)
WBC # BLD AUTO: 7.83 10*3/MM3 (ref 3.4–10.8)

## 2021-09-06 PROCEDURE — 94799 UNLISTED PULMONARY SVC/PX: CPT

## 2021-09-06 PROCEDURE — 99232 SBSQ HOSP IP/OBS MODERATE 35: CPT | Performed by: INTERNAL MEDICINE

## 2021-09-06 PROCEDURE — 25010000002 ENOXAPARIN PER 10 MG: Performed by: FAMILY MEDICINE

## 2021-09-06 PROCEDURE — 99024 POSTOP FOLLOW-UP VISIT: CPT | Performed by: SURGERY

## 2021-09-06 PROCEDURE — 80048 BASIC METABOLIC PNL TOTAL CA: CPT | Performed by: FAMILY MEDICINE

## 2021-09-06 PROCEDURE — 25010000002 PIPERACILLIN SOD-TAZOBACTAM PER 1 G: Performed by: SURGERY

## 2021-09-06 PROCEDURE — 85025 COMPLETE CBC W/AUTO DIFF WBC: CPT | Performed by: FAMILY MEDICINE

## 2021-09-06 RX ORDER — POTASSIUM CHLORIDE 750 MG/1
40 CAPSULE, EXTENDED RELEASE ORAL ONCE
Status: COMPLETED | OUTPATIENT
Start: 2021-09-06 | End: 2021-09-06

## 2021-09-06 RX ADMIN — TAZOBACTAM SODIUM AND PIPERACILLIN SODIUM 3.38 G: 375; 3 INJECTION, SOLUTION INTRAVENOUS at 15:43

## 2021-09-06 RX ADMIN — PRAVASTATIN SODIUM 20 MG: 20 TABLET ORAL at 20:05

## 2021-09-06 RX ADMIN — LEVOTHYROXINE SODIUM 75 MCG: 0.07 TABLET ORAL at 09:26

## 2021-09-06 RX ADMIN — ENOXAPARIN SODIUM 40 MG: 40 INJECTION SUBCUTANEOUS at 20:06

## 2021-09-06 RX ADMIN — SODIUM CHLORIDE, PRESERVATIVE FREE 10 ML: 5 INJECTION INTRAVENOUS at 02:21

## 2021-09-06 RX ADMIN — SERTRALINE HYDROCHLORIDE 50 MG: 50 TABLET ORAL at 09:26

## 2021-09-06 RX ADMIN — TAZOBACTAM SODIUM AND PIPERACILLIN SODIUM 3.38 G: 375; 3 INJECTION, SOLUTION INTRAVENOUS at 06:42

## 2021-09-06 RX ADMIN — POTASSIUM CHLORIDE 40 MEQ: 10 CAPSULE, COATED, EXTENDED RELEASE ORAL at 09:28

## 2021-09-06 NOTE — PROGRESS NOTES
"    [facility]    Name:  Andie Monreal   Age:  65 y.o.  Sex:  female  :  1955  MRN:  [id number]   Visit Number:  [id number]  Admission Date:  2021  Date Of Service:  21  Primary Care Physician:  Ashley Grier MD     LOS: 4 days :    Chief Complaint:      \"I am hungry and I need some solid food\"    Subjective:    Ms. Monreal was seen and examined this morning.  She is currently sitting up on the bed and is comfortable at rest.  She states that her abdominal pain has significantly improved.  She is requesting if her diet can be advanced.  She states that she has passed flatus but has not had any bowel movements which she attributes to not eating anything since the last 3 to 4 days.  Previous physician documentation, laboratory and imaging data have been reviewed.    Hospital Course:    Ms. Monreal is a 65-year-old female who presented emergency room with acute onset of abdominal pain, with work-up concerning for small bowel obstruction and volvulus with ischemic bowel.  She was admitted to the general surgery service and taken emergently for exploratory laparotomy by Dr. Sun on 2021 with evidence of reduction of volvulus and small bowel resection of the mid jejunum, with appendectomy.  We were asked to see postoperatively for medical management.  Suspect postop ileus. Bowel sounds improved on 2021 and she was started on clear liquid diet.     Review of Systems:     All systems were reviewed and negative except as mentioned in subjective, assessment and plan.    Vital Signs:    Temp:  [98.2 °F (36.8 °C)-99.5 °F (37.5 °C)] 98.3 °F (36.8 °C)  Heart Rate:  [] 74  Resp:  [18] 18  BP: (104-137)/(59-80) 137/67    Intake and output:    I/O last 3 completed shifts:  In: 50 [IV Piggyback:50]  Out: 3750 [Urine:3750]  I/O this shift:  In: -   Out: 75 [Urine:75]    Physical Examination:    General Appearance:  Alert and cooperative.   Head:  " Atraumatic and normocephalic.   Eyes: Conjunctivae and sclerae normal, no icterus. No pallor.   Throat: No oral lesions, no thrush, oral mucosa moist.   Neck: Supple, trachea midline, no thyromegaly.   Lungs:   Breath sounds heard bilaterally equally.  No crackles or wheezing. No Pleural rub or bronchial breathing.   Heart:  Normal S1 and S2, no murmur, no gallop, no rub. No JVD.   Abdomen:   Normal bowel sounds, no masses, no organomegaly. Soft, nontender, nondistended, no rebound tenderness.  Midline surgical bandage noted.  Surgical scars noted from the previous laparoscopic surgery.   Extremities: Supple, no edema, no cyanosis, no clubbing.   Skin: No bleeding or rash.   Neurologic: Alert and oriented x 3. No facial asymmetry. Moves all four limbs. No tremors.      Laboratory results:    Results from last 7 days   Lab Units 09/06/21  0556 09/05/21  0831 09/04/21  0700 09/03/21  0655 09/03/21  0655 09/01/21  2350 09/01/21  2350   SODIUM mmol/L 139 139 142   < > 141   < > 142   POTASSIUM mmol/L 3.2* 3.7 3.5   < > 3.4*   < > 3.2*   CHLORIDE mmol/L 104 106 107   < > 109*   < > 101   CO2 mmol/L 22.1 24.4 27.6   < > 25.4   < > 24.5   BUN mg/dL 12 7* 8   < > 12   < > 19   CREATININE mg/dL 0.72 0.72 0.74   < > 0.71   < > 0.84   CALCIUM mg/dL 8.9 8.7 8.8   < > 8.2*   < > 10.3   BILIRUBIN mg/dL  --   --   --   --  0.6  --  0.8   ALK PHOS U/L  --   --   --   --  41  --  74   ALT (SGPT) U/L  --   --   --   --  18  --  32   AST (SGOT) U/L  --   --   --   --  20  --  30   GLUCOSE mg/dL 93 123* 112*   < > 122*   < > 184*    < > = values in this interval not displayed.     Results from last 7 days   Lab Units 09/06/21  0556 09/05/21  0831 09/04/21  0700   WBC 10*3/mm3 7.83 7.80 13.02*   HEMOGLOBIN g/dL 10.2* 9.9* 10.3*   HEMATOCRIT % 30.4* 30.5* 31.8*   PLATELETS 10*3/mm3 195 167 178     I have reviewed the patient's laboratory results.    Radiology results:    No radiology results from the last 24 hrs     I have reviewed the  patient's radiology reports.    Medication Review:     I have reviewed the patient's active and prn medications.     Problem List:      SBO (small bowel obstruction) (CMS/HCC)    Essential hypertension    Hyperlipidemia    Assessment:    1.  Small bowel obstruction, concern for volvulus with ischemic bowel, status post exploratory laparotomy, postop day 4  2.  Status post appendectomy  3.  Hypertension  4.  Dyslipidemia  5.  Anxiety  6.  Arthritis  7.  Postoperative ileus, improved    Plan:    Ms. Monreal is doing significantly well with regards to her abdominal surgery.  She has good bowel sounds and will be advanced to clear liquid diet today.  I have advised her to walk around in the room as much as possible.  I have discussed the patient's condition with Dr. Monae from surgical services who did see the patient today.  If she continues to do well with her diet advancement, she may be able to go home tomorrow.    DVT Prophylaxis: Lovenox  Code Status: Full  Diet: Full liquid  Discharge Plan: Hopefully, home tomorrow.    Richard Negrete MD  09/06/21  16:11 EDT    Dictated utilizing Dragon dictation.

## 2021-09-06 NOTE — PLAN OF CARE
Goal Outcome Evaluation:  Plan of Care Reviewed With: patient           Outcome Summary: Motivated pt.  Up frequently walking in [place], very compliant with care.  Able to have small bowel movement, diet increased and tolerated well.

## 2021-09-06 NOTE — PROGRESS NOTES
LOS: 4 days   Patient Care Team:  Ashley Grier MD as PCP - General (Internal Medicine)  Brissa Mello MD as Consulting Physician (Obstetrics and Gynecology)  Batool Larios MD as Consulting Physician (Obstetrics and Gynecology)           HPI: Patient without complaints today.  Passing a minimal amount of flatus and had a small bowel movement    ROS:    Vital Signs  Temp:  [98.2 °F (36.8 °C)-99.5 °F (37.5 °C)] 98.2 °F (36.8 °C)  Heart Rate:  [] 89  Resp:  [18] 18  BP: (104-127)/(59-80) 104/72    Physical Exam:     General Appearance:  Alert and cooperative, not in any acute distress.   Cardiovascular/Heart:  Normal S1 and S2   GI/Abdomen:    Soft and nondistended.  Wound without evidence of infection                Results Review:       Lab Results (last 24 hours)     Procedure Component Value Units Date/Time    Basic Metabolic Panel [[id number]]  (Abnormal) Collected: 09/06/21 0556    Specimen: Blood Updated: 09/06/21 0635     Glucose 93 mg/dL      BUN 12 mg/dL      Creatinine 0.72 mg/dL      Sodium 139 mmol/L      Potassium 3.2 mmol/L      Chloride 104 mmol/L      CO2 22.1 mmol/L      Calcium 8.9 mg/dL      eGFR Non African Amer 81 mL/min/1.73      BUN/Creatinine Ratio 16.7     Anion Gap 12.9 mmol/L     Narrative:      GFR Normal >60  Chronic Kidney Disease <60  Kidney Failure <15      CBC & Differential [[id number]]  (Abnormal) Collected: 09/06/21 0556    Specimen: Blood Updated: 09/06/21 0621    Narrative:      The following orders were created for panel order CBC & Differential.  Procedure                               Abnormality         Status                     ---------                               -----------         ------                     CBC Auto Differential[[id number]]        Abnormal            Final result                 Please view results for these tests on the individual orders.    CBC Auto Differential [[id number]]  (Abnormal) Collected: 09/06/21 0556    Specimen:  Blood Updated: 09/06/21 0621     WBC 7.83 10*3/mm3      RBC 3.44 10*6/mm3      Hemoglobin 10.2 g/dL      Hematocrit 30.4 %      MCV 88.4 fL      MCH 29.7 pg      MCHC 33.6 g/dL      RDW 13.1 %      RDW-SD 42.5 fl      MPV 11.3 fL      Platelets 195 10*3/mm3      Neutrophil % 67.1 %      Lymphocyte % 19.3 %      Monocyte % 7.5 %      Eosinophil % 5.0 %      Basophil % 0.6 %      Immature Grans % 0.5 %      Neutrophils, Absolute 5.25 10*3/mm3      Lymphocytes, Absolute 1.51 10*3/mm3      Monocytes, Absolute 0.59 10*3/mm3      Eosinophils, Absolute 0.39 10*3/mm3      Basophils, Absolute 0.05 10*3/mm3      Immature Grans, Absolute 0.04 10*3/mm3      nRBC 0.0 /100 WBC     Tissue Pathology Exam [[id number]] Collected: 09/02/21 0821    Specimen: Tissue from Small Intestine; Tissue from Large Intestine, Appendix Updated: 09/06/21 0324     Case Report --     Surgical Pathology Report                         Case: [id number]                                  Authorizing Provider:  Sunny Sun MD        Collected:           09/02/2021 08:21 AM          Ordering Location:     [facility] OR Received:            09/02/2021 09:30 AM          Pathologist:           Anthony Hatfield MD                                                     Specimens:   1) - Large Intestine, Appendix                                                                      2) - Small Intestine                                                                        Final Diagnosis --     See Scanned Report                 Assessment/Plan       SBO (small bowel obstruction) (CMS/HCC)    Essential hypertension    Hyperlipidemia    Doing well postoperatively after small bowel resection.  Beginning to have bowel function return.  I will advance to full liquids today as tolerated and hopeful discharge tomorrow.    Bari Monae MD  09/06/21  13:05 EDT

## 2021-09-07 VITALS
WEIGHT: 143 LBS | HEIGHT: 65 IN | TEMPERATURE: 99.3 F | OXYGEN SATURATION: 100 % | HEART RATE: 87 BPM | DIASTOLIC BLOOD PRESSURE: 73 MMHG | RESPIRATION RATE: 14 BRPM | BODY MASS INDEX: 23.82 KG/M2 | SYSTOLIC BLOOD PRESSURE: 115 MMHG

## 2021-09-07 LAB
ANION GAP SERPL CALCULATED.3IONS-SCNC: 11.9 MMOL/L (ref 5–15)
BUN SERPL-MCNC: 13 MG/DL (ref 8–23)
BUN/CREAT SERPL: 16.5 (ref 7–25)
CALCIUM SPEC-SCNC: 8.9 MG/DL (ref 8.6–10.5)
CHLORIDE SERPL-SCNC: 106 MMOL/L (ref 98–107)
CO2 SERPL-SCNC: 23.1 MMOL/L (ref 22–29)
CREAT SERPL-MCNC: 0.79 MG/DL (ref 0.57–1)
GFR SERPL CREATININE-BSD FRML MDRD: 73 ML/MIN/1.73
GLUCOSE SERPL-MCNC: 97 MG/DL (ref 65–99)
POTASSIUM SERPL-SCNC: 3.6 MMOL/L (ref 3.5–5.2)
SODIUM SERPL-SCNC: 141 MMOL/L (ref 136–145)

## 2021-09-07 PROCEDURE — 99024 POSTOP FOLLOW-UP VISIT: CPT | Performed by: SURGERY

## 2021-09-07 PROCEDURE — 25010000002 PIPERACILLIN SOD-TAZOBACTAM PER 1 G: Performed by: SURGERY

## 2021-09-07 PROCEDURE — 80048 BASIC METABOLIC PNL TOTAL CA: CPT | Performed by: INTERNAL MEDICINE

## 2021-09-07 RX ORDER — HYDROCODONE BITARTRATE AND ACETAMINOPHEN 7.5; 325 MG/1; MG/1
1 TABLET ORAL EVERY 6 HOURS PRN
Qty: 15 TABLET | Refills: 0 | Status: SHIPPED | OUTPATIENT
Start: 2021-09-07

## 2021-09-07 RX ORDER — ONDANSETRON 2 MG/ML
4 INJECTION INTRAMUSCULAR; INTRAVENOUS ONCE AS NEEDED
Status: DISCONTINUED | OUTPATIENT
Start: 2021-09-07 | End: 2021-09-07 | Stop reason: HOSPADM

## 2021-09-07 RX ORDER — IBUPROFEN 600 MG/1
600 TABLET ORAL EVERY 6 HOURS PRN
Status: DISCONTINUED | OUTPATIENT
Start: 2021-09-07 | End: 2021-09-07 | Stop reason: HOSPADM

## 2021-09-07 RX ORDER — ONDANSETRON 4 MG/1
4 TABLET, FILM COATED ORAL ONCE AS NEEDED
Status: DISCONTINUED | OUTPATIENT
Start: 2021-09-07 | End: 2021-09-07 | Stop reason: HOSPADM

## 2021-09-07 RX ADMIN — TAZOBACTAM SODIUM AND PIPERACILLIN SODIUM 3.38 G: 375; 3 INJECTION, SOLUTION INTRAVENOUS at 00:30

## 2021-09-07 NOTE — CASE MANAGEMENT/SOCIAL WORK
Case Management Discharge Note                Selected Continued Care - Discharged on 9/7/2021 Admission date: 9/1/2021 - Discharge disposition: Home or Self Care    Destination    No services have been selected for the patient.              Durable Medical Equipment    No services have been selected for the patient.              Dialysis/Infusion    No services have been selected for the patient.              Home Medical Care    No services have been selected for the patient.              Therapy    No services have been selected for the patient.              Community Resources    No services have been selected for the patient.              Community & DME    No services have been selected for the patient.                  Transportation Services  Private: Car    Final Discharge Disposition Code: 01 - home or self-care

## 2021-09-07 NOTE — PLAN OF CARE
Goal Outcome Evaluation:         FERNANDO pt has had two bowel movements today. Ambulating in the [place]ale[place] without issue.

## 2021-09-07 NOTE — PROGRESS NOTES
LOS: 5 days   Patient Care Team:  Ashley Grier MD as PCP - General (Internal Medicine)  Brissa Mello MD as Consulting Physician (Obstetrics and Gynecology)  Batool Larios MD as Consulting Physician (Obstetrics and Gynecology)      Chief Complaint: Moving bowels, tolerating diet      Interval History:     Patient Complaints: See Above, passing flatus, passing stool    History taken from: patient RN    Vital Signs  Temp:  [98.2 °F (36.8 °C)-99.5 °F (37.5 °C)] 99.3 °F (37.4 °C)  Heart Rate:  [74-97] 87  Resp:  [14-18] 14  BP: (104-137)/(65-80) 115/73    Physical Exam:     General Appearance:    Alert, cooperative, in no acute distress   Head:    Normocephalic, without obvious abnormality, atraumatic   Lungs:     Clear to auscultation,respirations regular, even and                  unlabored    Heart:    Regular rhythm and normal rate, normal S1 and S2, no            murmur, no gallop, no rub, no click   Abdomen:     Normal bowel sounds, no masses, no organomegaly, soft        non-tender, non-distended, no guarding, no rebound                tenderness   Extremities:   Moves all extremities well, no edema, no cyanosis, no             redness   Pulses:   Pulses palpable and equal bilaterally   Skin:   No bleeding, bruising or rash        Results Review:       Lab Results (last 24 hours)     ** No results found for the last 24 hours. **              Assessment/Plan       SBO (small bowel obstruction) (CMS/HCC)    Essential hypertension    Hyperlipidemia      Stable white female has done well 5 days status post exploratory laparotomy with small bowel resection for gangrenous small bowel due to volvulus.  I had a detailed discussion with the patient today in the hospital, we will send her home today, I will see her back in the office next week.      Sunny Sun MD  09/07/21  06:05 EDT

## 2021-09-07 NOTE — PLAN OF CARE
Goal Outcome Evaluation:               Patient having BM's, ambulating well, pain well controlled.

## 2021-09-07 NOTE — DISCHARGE SUMMARY
Psychiatric HOSPITALIST   DISCHARGE SUMMARY      Name:  Andie Monreal   Age:  65 y.o.  Sex:  female  :  1955  MRN:  [id number]   Visit Number:  [id number]  Primary Care Physician:  Ashley Grier MD  Date of Discharge:  2021  Admission Date:  2021      Discharge Diagnosis:     Small bowel volvulus causing bowel obstruction    Patient Active Problem List   Diagnosis   • SBO (small bowel obstruction) (CMS/HCC)   • Small bowel obstruction (CMS/HCC)   • Essential hypertension   • Hyperlipidemia       Presenting Problem/History of Present Illness:    Small bowel obstruction (CMS/HCC) [K56.609]     Small bowel volvulus    Consults:     Consults     Date and Time Order Name Status Description    2021  9:17 AM Inpatient Hospitalist Consult Completed           Procedures Performed:    Procedure(s):  LAPAROTOMY EXPLORATORY WITH SMALL BOWEL RESECTION AND APPENDECTOMY         History of presenting illness:    See attached H&P    Hospital Course:    See attached progress notes    Vital Signs:    Temp:  [98.2 °F (36.8 °C)-99.5 °F (37.5 °C)] 99.3 °F (37.4 °C)  Heart Rate:  [74-97] 87  Resp:  [14-18] 14  BP: (104-137)/(65-80) 115/73    Physical Exam:      General Appearance:    Alert, cooperative, in no acute distress   Head:    Normocephalic, without obvious abnormality, atraumatic   Eyes:            Lids and lashes normal, conjunctivae and sclerae normal, no   icterus, no pallor, corneas clear, PERRLA   Ears:    Ears appear intact with no abnormalities noted   Throat:   No oral lesions, no thrush, oral mucosa moist   Neck:   No adenopathy, supple, trachea midline, no thyromegaly, no     carotid bruit, no JVD   Back:     No kyphosis present, no scoliosis present, no skin lesions,       erythema or scars, no tenderness to percussion or                   palpation,   range of motion normal   Lungs:     Clear to auscultation,respirations regular, even and                   unlabored     Heart:    Regular rhythm and normal rate, normal S1 and S2, no            murmur, no gallop, no rub, no click   Breast Exam:    Deferred   Abdomen:     Normal bowel sounds, no masses, no organomegaly, soft        non-tender, non-distended, no guarding, no rebound                 tenderness   Genitalia:    Deferred   Extremities:   Moves all extremities well, no edema, no cyanosis, no              redness   Pulses:   Pulses palpable and equal bilaterally   Skin:   No bleeding, bruising or rash   Lymph nodes:   No palpable adenopathy   Neurologic:   Cranial nerves 2 - 12 grossly intact, sensation intact, DTR        present and equal bilaterally         Pertinent Lab Results:     Results from last 7 days   Lab Units 09/06/21  0556 09/05/21  0831 09/04/21  0700 09/03/21  0655 09/03/21  0655 09/01/21  2350 09/01/21  2350   SODIUM mmol/L 139 139 142   < > 141   < > 142   POTASSIUM mmol/L 3.2* 3.7 3.5   < > 3.4*   < > 3.2*   CHLORIDE mmol/L 104 106 107   < > 109*   < > 101   CO2 mmol/L 22.1 24.4 27.6   < > 25.4   < > 24.5   BUN mg/dL 12 7* 8   < > 12   < > 19   CREATININE mg/dL 0.72 0.72 0.74   < > 0.71   < > 0.84   CALCIUM mg/dL 8.9 8.7 8.8   < > 8.2*   < > 10.3   BILIRUBIN mg/dL  --   --   --   --  0.6  --  0.8   ALK PHOS U/L  --   --   --   --  41  --  74   ALT (SGPT) U/L  --   --   --   --  18  --  32   AST (SGOT) U/L  --   --   --   --  20  --  30   GLUCOSE mg/dL 93 123* 112*   < > 122*   < > 184*    < > = values in this interval not displayed.     Results from last 7 days   Lab Units 09/06/21  0556 09/05/21  0831 09/04/21  0700   WBC 10*3/mm3 7.83 7.80 13.02*   HEMOGLOBIN g/dL 10.2* 9.9* 10.3*   HEMATOCRIT % 30.4* 30.5* 31.8*   PLATELETS 10*3/mm3 195 167 178         No results found for: BLOODCX, URINECX, WOUNDCX, MRSACX      Pertinent Radiology Results:    Imaging Results (All)     Procedure Component Value Units Date/Time    XR Abdomen KUB [[id number]] Collected: 09/02/21 0852     Updated: 09/02/21 0854     Narrative:      PROCEDURE: XR ABDOMEN KUB-     HISTORY: tube placement     FINDINGS: The visualized intestinal gas pattern appears unremarkable  without evidence to suggest obstruction.  No abnormal radiopacities are  seen in the abdomen.       NG tube is seen in the proximal stomach. Excreted contrast is noted  within the urinary tract.        Impression:      NG tube within the stomach     This report was finalized on 9/2/2021 8:52 AM by Mitch Don MD.    US [facility] OR Procedure [[id number]] Resulted: 09/02/21 0818     Updated: 09/02/21 0818    CT Abdomen Pelvis With Contrast [[id number]] Collected: 09/02/21 0336     Updated: 09/02/21 0340    Addenda:        ADDENDUM REPORT    ADDENDUM:  This report was discussed with Dr. Coyle on Sep 02, 2021 03:39:00 EDT.    Authenticated by Lupe Domínguez DO on 09/02/2021 03:39:17 AM  Signed: 09/02/21 0339 by Lupe Domínguez DO    Narrative:      FINAL REPORT    TECHNIQUE:  null    CLINICAL HISTORY:  lower abd pain    COMPARISON:  null    FINDINGS:  CT ABDOMEN AND PELVIS WITH CONTRAST    Comparison: None    Findings:    There is diffuse fluid distention of the ileal small bowel loops up to 3 cm. There is radial distribution of small bowel loops and the possibility of closed loop obstruction is raised although the 2 separate points of obstruction are not identified with   certainty. There is a small amount of ascites in the abdomen and pelvis. There is mesenteric edema especially in the left abdomen. No evidence for pneumatosis or pneumoperitoneum. Appendix is not identified with certainty. There is moderate to large   amount of stool throughout the colon.    No acute abnormalities in the solid organs, gallbladder or abdominal aorta. There are several scattered hepatic cysts. The SMA is patent. There are multiple bilateral subcentimeter renal cortical hypodensities that are too small to accurately   characterize.    Lung bases: Scattered atelectasis and/or  scarring.    Osseous structures: No acute abnormalities.      Impression:      Impression:    1. Findings most suggestive of small bowel obstruction with distribution of small bowel loops concerning for closed loop obstruction. No evidence for perforation or ischemia.    2. Mild abdominal and pelvic ascites with mesenteric edema localized to the left upper quadrant.    Authenticated by Lupe Domínguez DO on 09/02/2021 03:36:03 AM          Condition on Discharge:      Stable    Code status during the hospital stay:    <no information>    Discharge Disposition:    Home or Self Care    Discharge Medication:       Discharge Medications      New Medications      Instructions Start Date   HYDROcodone-acetaminophen 7.5-325 MG per tablet  Commonly known as: NORCO   1 tablet, Oral, Every 6 Hours PRN         Changes to Medications      Instructions Start Date   conjugated estrogens 0.625 MG/GM vaginal cream  Commonly known as: Premarin  What changed:   · how to take this  · when to take this   Use 0.5 - 2.0 grams intravaginally 1-3 times per week to control symptoms.         Continue These Medications      Instructions Start Date   Biotin 1000 MCG tablet   1,000 mcg, Oral, Daily      Calcium 600-D 600-400 MG-UNIT tablet  Generic drug: Calcium Carbonate-Vitamin D3   1 tablet, Oral, Daily      GLUCOSAMINE CHONDR 1500 COMPLX PO   1 tablet, Oral, Daily      levothyroxine 75 MCG tablet  Commonly known as: SYNTHROID, LEVOTHROID   75 mcg, Oral, Daily      Multivitamin Adult tablet tablet   1 tablet, Oral, Daily      nystatin-triamcinolone 906450-8.1 UNIT/GM-% cream  Commonly known as: MYCOLOG II   1 application, Topical, Daily PRN      pravastatin 20 MG tablet  Commonly known as: PRAVACHOL   20 mg, Oral, Nightly      sertraline 50 MG tablet  Commonly known as: ZOLOFT   50 mg, Oral, Daily             Discharge Diet:     Diet Instructions     Advance Diet as Tolerated      Please start with liquids only, patient may advance very slowly  at home.          Activity at Discharge:     Activity Instructions     Activity as Tolerated      1) No driving on pain medications  2) No heavy lifting  3) No strenuous activity          Follow-up Appointments:    No future appointments.      Test Results Pending at Discharge:    Pending Labs     Order Current Status    Basic Metabolic Panel Collected (09/07/21 0559)             Sunny Sun MD  09/07/21  06:04 EDT    Time: Discharge 30 min         How Severe Is Your Skin Lesion?: mild Is This A New Presentation, Or A Follow-Up?: Skin Lesions

## 2021-09-08 ENCOUNTER — READMISSION MANAGEMENT (OUTPATIENT)
Dept: CALL CENTER | Facility: HOSPITAL | Age: 66
End: 2021-09-08

## 2021-09-08 NOTE — PROGRESS NOTES
"Patient: Andie Monreal    YOB: 1955    Date: 09/13/2021    Primary Care Provider: Ashley Grier MD    Chief Complaint   Patient presents with   • Post-op Follow-up     exp lap       History of present illness:  I saw the patient in the office today as a followup from their recent exploratory laparotomy with reduction of volvulus with lysis of adhesive band, small bowel resection and appendectomy.  They state that they have done well and are having no complaints.    Vital Signs:   Vitals:    09/13/21 1117   BP: 104/68   Pulse: 92   Temp: 98.2 °F (36.8 °C)   TempSrc: Temporal   SpO2: 100%   Weight: 64.9 kg (143 lb 1.3 oz)   Height: 165.1 cm (65\")       Physical Exam:   General Appearance:    Alert, cooperative, in no acute distress   Abdomen:     no masses, no organomegaly, soft non-tender, non-distended, no guarding, wounds are well healed, no evidence of recurrent hernia     Assessment / Plan:    1. Postoperative visit        I did discuss the situation with the patient today in the office and they have done well from their recent exploratory laparotomy with reduction of volvulus with lysis of adhesive band, small bowel resection and appendectomy .  I have released the patient back to normal activity, they understand that they need to be careful about heavy lifting.  I need to see the patient back in the office only if they are having further problems, they know to call me if they are.    Electronically signed by Sunny Snu MD  09/13/21    Portions of this note have been scribed for Sunny Sun MD by Michelle Milligan. 9/13/2021  11:19 EDT                  "

## 2021-09-08 NOTE — OUTREACH NOTE
Prep Survey      Responses   [facility] facility patient discharged from?  [facility]   Is LACE score < 7 ?  No   Emergency Room discharge w/ pulse ox?  No   Eligibility  Readm Mgmt   Discharge diagnosis  SBO   Does the patient have one of the following disease processes/diagnoses(primary or secondary)?  General Surgery   Does the patient have Home health ordered?  No   Is there a DME ordered?  No   Comments regarding appointments  Appts needed   Prep survey completed?  Yes          Sandee Horton RN

## 2021-09-13 ENCOUNTER — OFFICE VISIT (OUTPATIENT)
Dept: SURGERY | Facility: CLINIC | Age: 66
End: 2021-09-13

## 2021-09-13 VITALS
SYSTOLIC BLOOD PRESSURE: 104 MMHG | DIASTOLIC BLOOD PRESSURE: 68 MMHG | HEIGHT: 65 IN | WEIGHT: 143.08 LBS | OXYGEN SATURATION: 100 % | BODY MASS INDEX: 23.84 KG/M2 | TEMPERATURE: 98.2 F | HEART RATE: 92 BPM

## 2021-09-13 DIAGNOSIS — Z48.89 POSTOPERATIVE VISIT: Primary | ICD-10-CM

## 2021-09-13 PROCEDURE — 99024 POSTOP FOLLOW-UP VISIT: CPT | Performed by: SURGERY

## 2021-09-14 ENCOUNTER — READMISSION MANAGEMENT (OUTPATIENT)
Dept: CALL CENTER | Facility: HOSPITAL | Age: 66
End: 2021-09-14

## 2021-09-14 NOTE — OUTREACH NOTE
General Surgery Week 1 Survey      Responses   [facility] patient discharged from?  [facility]   Does the patient have one of the following disease processes/diagnoses(primary or secondary)?  General Surgery   Week 1 attempt successful?  Yes   Call start time  1133   Call end time  1140   Discharge diagnosis  SBO, LAPAROTOMY EXPLORATORY WITH SMALL BOWEL RESECTION AND APPENDECTOMY   Is patient permission given to speak with other caregiver?  Yes   List who call center can speak with  Claude Jocelin, spouse   Person spoke with today (if not patient) and relationship  spouse   Meds reviewed with patient/caregiver?  Yes   Does the patient have all medications related to this admission filled (includes all antibiotics, pain medications, etc.)  Yes   Is the patient taking all medications as directed (includes completed medication regime)?  Yes   Does the patient have a follow up appointment scheduled with their surgeon?  Yes [Patient saw Dr Sun yesterday. ]   Has the patient kept scheduled appointments due by today?  Yes   Has home health visited the patient within 72 hours of discharge?  N/A   Psychosocial issues?  No   Did the patient receive a copy of their discharge instructions?  Yes   Nursing interventions  Reviewed instructions with patient   What is the patient's perception of their health status since discharge?  Improving   Nursing interventions  Nurse provided patient education   Is the patient /caregiver able to teach back basic post-op care?  Keep incision areas clean,dry and protected, Lifting as instructed by MD in discharge instructions   Is the patient/caregiver able to teach back signs and symptoms of incisional infection?  Increased redness, swelling or pain at the incisonal site, Increased drainage or bleeding, Incisional warmth, Pus or odor from incision, Fever   Is the patient/caregiver able to teach back steps to recovery at home?  Set small, achievable goals for return to baseline health,  Rest and rebuild strength, gradually increase activity [Advance diet slowly as tolerated. ]   If the patient is a current smoker, are they able to teach back resources for cessation?  Not a smoker   Is the patient/caregiver able to teach back the hierarchy of who to call/visit for symptoms/problems? PCP, Specialist, Home health nurse, Urgent Care, ED, 911  Yes   Week 1 call completed?  Yes          Eva Rao RN

## 2021-09-24 ENCOUNTER — READMISSION MANAGEMENT (OUTPATIENT)
Dept: CALL CENTER | Facility: HOSPITAL | Age: 66
End: 2021-09-24

## 2021-09-24 NOTE — OUTREACH NOTE
General Surgery Week 2 Survey      Responses   [facility] patient discharged from?  [facility]   Does the patient have one of the following disease processes/diagnoses(primary or secondary)?  General Surgery   Week 2 attempt successful?  Yes   Call start time  1355   Call end time  1404   Discharge diagnosis  SBO, LAPAROTOMY EXPLORATORY WITH SMALL BOWEL RESECTION AND APPENDECTOMY   Meds reviewed with patient/caregiver?  Yes   Is the patient having any side effects they believe may be caused by any medication additions or changes?  No   Does the patient have all medications related to this admission filled (includes all antibiotics, pain medications, etc.)  Yes   Is the patient taking all medications as directed (includes completed medication regime)?  Yes   Does the patient have a follow up appointment scheduled with their surgeon?  Yes   Has the patient kept scheduled appointments due by today?  Yes   Has home health visited the patient within 72 hours of discharge?  N/A   Psychosocial issues?  No   Comments  staples have been removed, steri strips intact, incision healing well, states bowel function fluctuating between nl BM's and some constipation, treated, led to some diarrhea   Did the patient receive a copy of their discharge instructions?  Yes   Nursing interventions  Reviewed instructions with patient   What is the patient's perception of their health status since discharge?  Improving   Nursing interventions  Nurse provided patient education   Is the patient /caregiver able to teach back basic post-op care?  Keep incision areas clean,dry and protected, Lifting as instructed by MD in discharge instructions, Do not remove steri-strips   Is the patient/caregiver able to teach back signs and symptoms of incisional infection?  Increased redness, swelling or pain at the incisonal site, Increased drainage or bleeding, Incisional warmth, Pus or odor from incision, Fever   Is the patient/caregiver able to teach  back steps to recovery at home?  Set small, achievable goals for return to baseline health, Rest and rebuild strength, gradually increase activity, Eat a well-balance diet   Is the patient/caregiver able to teach back the hierarchy of who to call/visit for symptoms/problems? PCP, Specialist, Home health nurse, Urgent Care, ED, 911  Yes   Additional teach back comments  states has been consciously choosing foods compatible with postop care, exercising as tolerated   Week 2 call completed?  Yes          Katherine Paul RN

## 2021-11-09 NOTE — CASE MANAGEMENT/SOCIAL WORK
Discharge Planning Assessment  [facility]     Patient Name: Andie Monreal  MRN: [id number]  Today's Date: 9/3/2021    Admit Date: 9/1/2021    Discharge Needs Assessment     Row Name 09/03/21 1141       Living Environment    Lives With  child(jeancarlos), adult;spouse    Name(s) of Who Lives With Patient  Claude Taylor (spouse) & Vinny Monreal (son)    Current Living Arrangements  home/apartment/condo    Primary Care Provided by  self    Provides Primary Care For  no one    Family Caregiver if Needed  child(jeancarlos), adult;spouse    Family Caregiver Names  Vinny Monreal (son) and Claude Taylor (spouse)    Quality of Family Relationships  supportive;involved;helpful    Able to Return to Prior Arrangements  yes       Resource/Environmental Concerns    Resource/Environmental Concerns  none       Transition Planning    Patient/Family Anticipates Transition to  home with family    Patient/Family Anticipated Services at Transition  none    Transportation Anticipated  family or friend will provide       Discharge Needs Assessment    Readmission Within the Last 30 Days  no previous admission in last 30 days    Equipment Currently Used at Home  none    Concerns to be Addressed  no discharge needs identified    Equipment Needed After Discharge  walker, rolling possible walker at discharge. Is going to reach out to her spouse to see if they already have one.        Discharge Plan     Row Name 09/03/21 1144       Plan    Plan Comments SW met with pt at bedside to complete discharge planning. Confirmed pt's address as listed in the chart. Pt lives with her spouse and adult son. Independent at home. States that she does yard work and tries to exercise regularly. Pt reports that she does not use any equipment at home. Pt states that she might need a walker at discharge. She says that she might already have a walker at home but isn't for sure. Confirmed PCP as listed in the chart. Pt appears to be well supported by her spouse and son. Pt  states that a family member will transport her at discharge. Pt reports no CM needs at this time. SW will continue to follow and assist as needed.        Continued Care and Services - Admitted Since 9/1/2021    Coordination has not been started for this encounter.         Demographic Summary     Row Name 09/03/21 1139       General Information    Admission Type  inpatient    Arrived From  emergency department    Referral Source  admission list    Reason for Consult  discharge planning    Preferred Language  English     Used During This Interaction  no        Functional Status     Row Name 09/03/21 1140       Functional Status    Usual Activity Tolerance  good       Functional Status, IADL    Medications  independent    Meal Preparation  independent    Housekeeping  independent    Laundry  independent    Shopping  independent    IADL Comments  Pt states that she tries to exercise often and does yard work as well.       Employment/    Employment Status  retired        Psychosocial     Row Name 09/03/21 1140       Intellectual Performance WDL    Level of Consciousness  Alert       Coping/Stress    Patient Personal Strengths  positive attitude;strong support system    Sources of Support  adult child(jeancarlos);spouse    Reaction to Health Status  accepting       Developmental Stage (Eriksson's)    Developmental Stage  Stage 8 (63 years-death/Late Adulthood) Integrity vs. Despair        Abuse/Neglect    No documentation.       Legal    No documentation.       Substance Abuse    No documentation.       Patient Forms    No documentation.           ROMEO Hines     mat in asu/done

## 2022-03-29 ENCOUNTER — TRANSCRIBE ORDERS (OUTPATIENT)
Dept: ADMINISTRATIVE | Facility: HOSPITAL | Age: 67
End: 2022-03-29

## 2022-03-29 DIAGNOSIS — M85.80 OSTEOPENIA, UNSPECIFIED LOCATION: ICD-10-CM

## 2022-03-29 DIAGNOSIS — Z13.820 ENCOUNTER FOR SCREENING FOR OSTEOPOROSIS: ICD-10-CM

## 2022-03-29 DIAGNOSIS — Z13.820 SCREENING FOR OSTEOPOROSIS: Primary | ICD-10-CM

## 2022-04-12 ENCOUNTER — APPOINTMENT (OUTPATIENT)
Dept: BONE DENSITY | Facility: HOSPITAL | Age: 67
End: 2022-04-12

## 2022-04-12 DIAGNOSIS — M85.80 OSTEOPENIA, UNSPECIFIED LOCATION: ICD-10-CM

## 2022-04-12 PROCEDURE — 77080 DXA BONE DENSITY AXIAL: CPT

## 2022-07-26 ENCOUNTER — TRANSCRIBE ORDERS (OUTPATIENT)
Dept: ADMINISTRATIVE | Facility: HOSPITAL | Age: 67
End: 2022-07-26

## 2022-07-26 DIAGNOSIS — Z12.31 VISIT FOR SCREENING MAMMOGRAM: Primary | ICD-10-CM

## 2022-10-04 ENCOUNTER — HOSPITAL ENCOUNTER (OUTPATIENT)
Dept: MAMMOGRAPHY | Facility: HOSPITAL | Age: 67
Discharge: HOME | End: 2022-10-04
Admitting: OBSTETRICS & GYNECOLOGY

## 2022-10-04 DIAGNOSIS — Z12.31 VISIT FOR SCREENING MAMMOGRAM: ICD-10-CM

## 2022-10-04 PROCEDURE — 77063 BREAST TOMOSYNTHESIS BI: CPT

## 2022-10-04 PROCEDURE — 77067 SCR MAMMO BI INCL CAD: CPT

## 2023-09-01 ENCOUNTER — HOSPITAL ENCOUNTER (EMERGENCY)
Facility: HOSPITAL | Age: 68
Discharge: HOME | End: 2023-09-01
Attending: EMERGENCY MEDICINE
Payer: MEDICARE

## 2023-09-01 VITALS
DIASTOLIC BLOOD PRESSURE: 75 MMHG | RESPIRATION RATE: 18 BRPM | HEIGHT: 65 IN | SYSTOLIC BLOOD PRESSURE: 127 MMHG | WEIGHT: 137.5 LBS | HEART RATE: 67 BPM | TEMPERATURE: 97.6 F | OXYGEN SATURATION: 99 % | BODY MASS INDEX: 22.91 KG/M2

## 2023-09-01 DIAGNOSIS — L02.91 ABSCESS: Primary | ICD-10-CM

## 2023-09-01 DIAGNOSIS — L72.3 SEBACEOUS CYST: ICD-10-CM

## 2023-09-01 PROCEDURE — 0 LIDOCAINE 1 % SOLUTION: Performed by: PHYSICIAN ASSISTANT

## 2023-09-01 PROCEDURE — 87070 CULTURE OTHR SPECIMN AEROBIC: CPT | Performed by: PHYSICIAN ASSISTANT

## 2023-09-01 PROCEDURE — 87205 SMEAR GRAM STAIN: CPT | Performed by: PHYSICIAN ASSISTANT

## 2023-09-01 PROCEDURE — 99282 EMERGENCY DEPT VISIT SF MDM: CPT

## 2023-09-01 PROCEDURE — 87186 SC STD MICRODIL/AGAR DIL: CPT | Performed by: PHYSICIAN ASSISTANT

## 2023-09-01 RX ORDER — CEPHALEXIN 500 MG/1
500 CAPSULE ORAL 3 TIMES DAILY
Qty: 21 CAPSULE | Refills: 0 | Status: SHIPPED | OUTPATIENT
Start: 2023-09-01 | End: 2023-09-08

## 2023-09-01 RX ORDER — LIDOCAINE HYDROCHLORIDE 10 MG/ML
10 INJECTION, SOLUTION INFILTRATION; PERINEURAL ONCE
Status: COMPLETED | OUTPATIENT
Start: 2023-09-01 | End: 2023-09-01

## 2023-09-01 RX ADMIN — LIDOCAINE HYDROCHLORIDE 10 ML: 10 INJECTION, SOLUTION INFILTRATION; PERINEURAL at 12:30

## 2023-09-01 NOTE — ED PROVIDER NOTES
Subjective  History of Present Illness:    Chief Complaint:   Chief Complaint   Patient presents with    Abscess      History of Present Illness: Andie Monreal is a 67 y.o. female who presents to the emergency department complaining of abscess to left groin.  Symptoms started Tuesday.  No fever or chills.  Very small amount of drainage.  Onset: Tuesday  Duration: Progressively worsening, ongoing  Exacerbating / Alleviating factors: Worse with palpation and expression  Associated symptoms: None, no fevers or chills      Nurses Notes reviewed and agree, including vitals, allergies, social history and prior medical history.     Review of Systems   Constitutional: Negative.    HENT: Negative.     Eyes: Negative.    Respiratory: Negative.     Cardiovascular: Negative.    Gastrointestinal: Negative.    Genitourinary: Negative.    Musculoskeletal: Negative.    Skin:         Abscess to the left groin   Neurological: Negative.    Psychiatric/Behavioral: Negative.       Past Medical History:   Diagnosis Date    Anemia     Anxiety and depression     Arthritis     History of blood transfusion     AT BIRTH    Hyperlipidemia     Hypertension     Murmur     Skin cancer     SQUAMOUS CELL-NECK    Thyroid disease        Allergies:    Patient has no known allergies.      Past Surgical History:   Procedure Laterality Date    BILATERAL SALPINGO OOPHORECTOMY  2007    DR B[facility]ERS    BREAST BIOPSY      bilateral, benign    CARPAL TUNNEL RELEASE Left      SECTION      CLOSED REDUCTION ANKLE FRACTURE Left     COLONOSCOPY  2016    EXPLORATORY LAPAROTOMY N/A 2021    Procedure: LAPAROTOMY EXPLORATORY WITH SMALL BOWEL RESECTION AND APPENDECTOMY;  Surgeon: Sunny Sun MD;  Location: [facility];  Service: General;  Laterality: N/A;    HYSTERECTOMY  2007    [facility] (DR BOWERS)    OOPHORECTOMY      SACROCOLPOPEXY N/A 2021    Procedure: SACROCOLPOPEXY ROBOTIC WITH MESH INSERTION CYSTOSCOPY;  Surgeon: Ric  "Batool Caldera MD;  Location: [facility];  Service: Robotics - DaVinci;  Laterality: N/A;         Social History     Socioeconomic History    Marital status:    Tobacco Use    Smoking status: Former    Smokeless tobacco: Never    Tobacco comments:     Quit in 1981   Vaping Use    Vaping Use: Never used   Substance and Sexual Activity    Alcohol use: Never    Drug use: No    Sexual activity: Never         Family History   Problem Relation Age of Onset    Diabetes Father     Coronary artery disease Father     Hypertension Father     Osteoporosis Mother     Hypertension Mother     Breast cancer Neg Hx        Objective  Physical Exam:  /75   Pulse 67   Temp 97.6 °F (36.4 °C)   Resp 18   Ht 165.1 cm (65\")   Wt 62.4 kg (137 lb 8 oz)   SpO2 99%   BMI 22.88 kg/m²      Physical Exam  Vitals and nursing note reviewed.   Constitutional:       General: She is not in acute distress.     Appearance: Normal appearance. She is normal weight. She is not ill-appearing, toxic-appearing or diaphoretic.   HENT:      Head: Normocephalic and atraumatic.      Nose: Nose normal.   Eyes:      Extraocular Movements: Extraocular movements intact.   Cardiovascular:      Rate and Rhythm: Normal rate.   Pulmonary:      Effort: Pulmonary effort is normal.   Abdominal:      General: Abdomen is flat.   Musculoskeletal:         General: Normal range of motion.      Cervical back: Normal range of motion.   Skin:     General: Skin is warm and dry.      Comments: 2.5 x 2 cm abscess to the left groin with mild overlying erythema.   Neurological:      General: No focal deficit present.      Mental Status: She is alert. Mental status is at baseline.   Psychiatric:         Mood and Affect: Mood normal.         Behavior: Behavior normal.         Procedures  Incision and Drainage Procedure    Incision and Drainage of abscess located to patients left groin  size 2 x 2.5 cm  preparation with alcohol wipe  anesthesia achieved with 1% " lidocaine without epinephrine  incision with 11 blade scalpel  drained (amount and quality) small amount of bloody and purulent drainage, some thick white drainage consistent with sebum  probed and deloculated (complicated abscess)  dressing: Gauze  patient tolerated procedure well  ED Course:         Lab Results (last 24 hours)       ** No results found for the last 24 hours. **             No radiology results from the last 24 hrs       Medical Decision Making  Risk  Prescription drug management.        Andie Monreal is a 67 y.o. female who presents to the emergency department for evaluation of abscess to the left groin    Differential diagnosis includes abscess, cellulitis, sebaceous cyst among other etiologies.    Chart review if available included outside testing, previous visits, prior labs, prior imaging, available notes from prior evaluations or visits with specialists, medication list, allergies, past medical history, past surgical history when applicable.    Patient was treated with incision and drainage and antibiotics    Findings most consistent with an infected sebaceous cyst.  Patient has dermatology with whom she follows.  Will cover on antibiotics and have her follow-up once infection symptoms have cleared for possible excision.    Plan for disposition is discharged home.  Patient/family comfortable with and understanding of the plan.      Final diagnoses:   Abscess   Sebaceous cyst          Anshu Hartman PA-C  09/01/23 [id number]

## 2023-09-04 LAB
BACTERIA SPEC AEROBE CULT: ABNORMAL
BACTERIA SPEC AEROBE CULT: ABNORMAL
GRAM STN SPEC: ABNORMAL

## 2023-10-10 ENCOUNTER — HOSPITAL ENCOUNTER (OUTPATIENT)
Dept: MAMMOGRAPHY | Facility: HOSPITAL | Age: 68
Discharge: HOME | End: 2023-10-10
Admitting: OBSTETRICS & GYNECOLOGY
Payer: MEDICARE

## 2023-10-10 DIAGNOSIS — Z12.31 VISIT FOR SCREENING MAMMOGRAM: ICD-10-CM

## 2023-10-10 PROCEDURE — 77063 BREAST TOMOSYNTHESIS BI: CPT

## 2023-10-10 PROCEDURE — 77067 SCR MAMMO BI INCL CAD: CPT

## 2024-08-16 ENCOUNTER — TRANSCRIBE ORDERS (OUTPATIENT)
Dept: ADMINISTRATIVE | Facility: HOSPITAL | Age: 69
End: 2024-08-16
Payer: MEDICARE

## 2024-08-16 DIAGNOSIS — Z12.31 ENCOUNTER FOR SCREENING MAMMOGRAM FOR MALIGNANT NEOPLASM OF BREAST: Primary | ICD-10-CM

## 2024-11-08 ENCOUNTER — HOSPITAL ENCOUNTER (OUTPATIENT)
Dept: MAMMOGRAPHY | Facility: HOSPITAL | Age: 69
Discharge: HOME | End: 2024-11-08
Admitting: INTERNAL MEDICINE
Payer: MEDICARE

## 2024-11-08 DIAGNOSIS — Z12.31 ENCOUNTER FOR SCREENING MAMMOGRAM FOR MALIGNANT NEOPLASM OF BREAST: ICD-10-CM

## 2024-11-08 PROCEDURE — 77063 BREAST TOMOSYNTHESIS BI: CPT

## 2024-11-08 PROCEDURE — 77067 SCR MAMMO BI INCL CAD: CPT

## 2025-07-17 ENCOUNTER — OFFICE VISIT (OUTPATIENT)
Dept: ORTHOPEDIC SURGERY | Facility: CLINIC | Age: 70
End: 2025-07-17
Payer: MEDICARE

## 2025-07-17 VITALS
DIASTOLIC BLOOD PRESSURE: 80 MMHG | SYSTOLIC BLOOD PRESSURE: 132 MMHG | WEIGHT: 146 LBS | BODY MASS INDEX: 24.32 KG/M2 | HEIGHT: 65 IN

## 2025-07-17 DIAGNOSIS — M25.561 ARTHRALGIA OF RIGHT KNEE: ICD-10-CM

## 2025-07-17 DIAGNOSIS — M17.11 PRIMARY OSTEOARTHRITIS OF RIGHT KNEE: Primary | ICD-10-CM

## 2025-07-17 DIAGNOSIS — M19.90 EXACERBATION OF OSTEOARTHRITIS: ICD-10-CM

## 2025-07-17 RX ORDER — LORATADINE 10 MG/1
TABLET ORAL
COMMUNITY
Start: 2023-08-08

## 2025-07-17 RX ORDER — ESTRADIOL 0.1 MG/G
CREAM VAGINAL
COMMUNITY
Start: 2023-06-21

## 2025-07-17 RX ORDER — MEMANTINE HYDROCHLORIDE 5 MG/1
TABLET ORAL
COMMUNITY
Start: 2025-03-03

## 2025-07-17 NOTE — PROGRESS NOTES
Office Note     Name: Andie Monreal    : 1955     MRN: [id number]     Chief Complaint  Pain of the Right Knee (Knee pain for 3 days. She caught her foot on rug on her kitchen as she was turning and hurt knee. She has some swelling in knee. )    Subjective     History of Present Illness:  Andie Monreal is a 69 y.o. female presenting today for the evaluation of acute right medial knee pain ongoing for about 3 days.  Patient states that she caught her foot on a rug in her kitchen and twisted her knee which caused initially severe pain.  She notes over the past 2 days her symptoms have improved dramatically.  She has been using Salonpas and taking over-the-counter Tylenol which has helped a lot.  She denies a history of chronic right knee pain though does recall a few relatively minor injuries to the right knee.  She denies paresthesias in her right lower extremity.  She denies difficulty walking at this time or instability.      Review of Systems     Past Medical History:   Diagnosis Date    Anemia     Anxiety and depression     Arthritis     History of blood transfusion     AT BIRTH    Hyperlipidemia     Hypertension     Murmur     Skin cancer     SQUAMOUS CELL-NECK    Thyroid disease         Past Surgical History:   Procedure Laterality Date    BILATERAL SALPINGO OOPHORECTOMY  2007    DR BOWERS    BREAST BIOPSY Bilateral      and  - Both benign    CARPAL TUNNEL RELEASE Left      SECTION      CLOSED REDUCTION ANKLE FRACTURE Left     COLONOSCOPY  2016    EXPLORATORY LAPAROTOMY N/A 2021    Procedure: LAPAROTOMY EXPLORATORY WITH SMALL BOWEL RESECTION AND APPENDECTOMY;  Surgeon: Sunny Sun MD;  Location: [facility];  Service: General;  Laterality: N/A;    HYSTERECTOMY  2007    [facility] (DR BOWERS)    OOPHORECTOMY      SACROCOLPOPEXY N/A 2021    Procedure: SACROCOLPOPEXY ROBOTIC WITH MESH INSERTION CYSTOSCOPY;  Surgeon: Batool Larios MD;   Location: [facility] OR;  Service: Robotics - DaVinci;  Laterality: N/A;       Family History   Problem Relation Age of Onset    Osteoporosis Mother     Hypertension Mother     Diabetes Father     Coronary artery disease Father     Hypertension Father     Breast cancer Neg Hx     Ovarian cancer Neg Hx        Social History     Socioeconomic History    Marital status:    Tobacco Use    Smoking status: Former    Smokeless tobacco: Never    Tobacco comments:     Quit in 1981   Vaping Use    Vaping status: Never Used   Substance and Sexual Activity    Alcohol use: Never    Drug use: No    Sexual activity: Never         Current Outpatient Medications:     Biotin 1000 MCG tablet, Take 1,000 mcg by mouth Daily., Disp: , Rfl:     Calcium Carbonate-Vitamin D3 (Calcium 600-D) 600-400 MG-UNIT tablet, Take 1 tablet by mouth Daily., Disp: , Rfl:     estradiol (ESTRACE) 0.1 MG/GM vaginal cream, 1g per vagina 2x weekly, Disp: , Rfl:     Glucosamine-Chondroit-Vit C-Mn (GLUCOSAMINE CHONDR 1500 COMPLX PO), Take 1 tablet by mouth Daily., Disp: , Rfl:     levothyroxine (SYNTHROID, LEVOTHROID) 75 MCG tablet, Take 1 tablet by mouth Daily., Disp: , Rfl: 3    loratadine (CLARITIN) 10 MG tablet, Take 1 tablet every day by oral route at bedtime for 90 days., Disp: , Rfl:     memantine (NAMENDA) 5 MG tablet, Take 1 tablet twice a day by oral route for 90 days., Disp: , Rfl:     multivitamin with minerals (Multivitamin Adult) tablet tablet, Take 1 tablet by mouth Daily., Disp: , Rfl:     nystatin-triamcinolone (MYCOLOG II) 359152-6.1 UNIT/GM-% cream, Apply 1 Application topically to the appropriate area as directed Daily As Needed., Disp: , Rfl:     pravastatin (PRAVACHOL) 20 MG tablet, Take 1 tablet by mouth Every Night., Disp: , Rfl: 3    sertraline (ZOLOFT) 50 MG tablet, Take 1 tablet by mouth Daily., Disp: , Rfl: 3    Allergies   Allergen Reactions    Sulfamethoxazole-Trimethoprim Rash           Objective   /80   Ht 165.1 cm  "(65\")   Wt 66.2 kg (146 lb)   BMI 24.30 kg/m²    BMI is within normal parameters. No other follow-up for BMI required.       Physical Exam  Musculoskeletal:      Right knee: Effusion present.       Right Knee Exam     Muscle Strength   The patient has normal right knee strength.    Tenderness   The patient is experiencing tenderness in the medial joint line.    Range of Motion   The patient has normal right knee ROM.    Tests   Varus: negative Valgus: negative  Lachman:  Anterior - negative    Posterior - negative    Other   Erythema: absent  Sensation: normal  Pulse: present  Swelling: none  Effusion: effusion present    Comments:  Mild effusion is noted.           Extremity DVT signs are negative on physical exam with negative Spencer sign, no calf pain, no palpable cords, and no skin tone change     Independent Review of Radiographic Studies:    2 view plain films of the right knee were taken in the office today, for the evaluation of pain and joint condition, with no comparison views available.   No acute osseous abnormalities are seen.  There is end-stage joint space narrowing and osteophytic lipping of the medial compartment.  The lateral compartment appears preserved.  There is perhaps mild joint space narrowing in the patellofemoral compartment with tiny osteophytes on the superior and inferior poles of the patella.        Procedures    Assessment and Plan   Diagnoses and all orders for this visit:    1. Primary osteoarthritis of right knee (Primary)    2. Exacerbation of osteoarthritis    3. Arthralgia of right knee  -     XR Knee 1 or 2 View Right; Future       Discussion of orthopedic goals  Orthopedic activities reviewed and patient expressed appreciation  Risk, benefits, and merits of treatment alternatives reviewed with the patient and questions answered  Patient guided on mobility and conditioning exercises  RICE, heat, and/or modalities as needed and beneficial  Elevate leg for residual " swelling  Discussed gentle range of motion activities/exercises for the affected joint with the patient.    Reduced physical activity as appropriate and avoid aggravating activities.   Weight bearing parameters reviewed.   Use brace as instructed.     Recommendations/Plan:  Exercise, medications, injections, other patient advice, and return appointment as noted.  Brace: Knee ligament stabilizing brace.  Patient and/or guardian(s) were encouraged to call or return to the office for any issues or concerns.    Patient is displaying signs and symptoms consistent with an acute osteoarthritis exacerbation.  I discussed multiple pain management modalities with the patient.  She is elected to continue with over-the-counter Tylenol, Salonpas patches, hinged knee brace.  I recommend cortisone injection though she politely declines today stating that her symptoms have improved dramatically.  I advised follow-up with me as needed.    Return if symptoms worsen or fail to improve.

## (undated) DEVICE — SUT VIC 0 TIES 18IN J912G

## (undated) DEVICE — GLV SURG SENSICARE PI MIC PF SZ6.5 LF STRL

## (undated) DEVICE — PACK,SET UP,NO DRAPES: Brand: MEDLINE

## (undated) DEVICE — APPL CHLORAPREP TINTED 26ML TEAL

## (undated) DEVICE — LAP PORT CLOSURE GUIDES 5MM AND 10/12MM: Brand: LAP PORT CLOSURE GUIDES 5MM AND 10/12MM

## (undated) DEVICE — DRAPE,TOP,102X53,STERILE: Brand: MEDLINE

## (undated) DEVICE — ENDOPATH XCEL BLADELESS TROCARS WITH STABILITY SLEEVES: Brand: ENDOPATH XCEL

## (undated) DEVICE — DEV OPN LIGASURE CRV 180D 36MM 13.5CM  1P/U

## (undated) DEVICE — ROYALSILK SURGICAL GOWN, L: Brand: CONVERTORS

## (undated) DEVICE — CANNULA SEAL

## (undated) DEVICE — COLUMN DRAPE

## (undated) DEVICE — ST TBG CONN PNEUMOCLEAR EVAC SMOKE HEAT/HUMID

## (undated) DEVICE — RICH MAJOR PROCEDURE: Brand: MEDLINE INDUSTRIES, INC.

## (undated) DEVICE — PK MAJ GYN DAVINCI 10

## (undated) DEVICE — SPNG GZ WOVN 4X4IN 12PLY 10/BX STRL

## (undated) DEVICE — SUT SILK 2/0 SUTUPAK TIES 24IN SA75H

## (undated) DEVICE — ENDOPATH PNEUMONEEDLE INSUFFLATION NEEDLES WITH LUER LOCK CONNECTORS 120MM: Brand: ENDOPATH

## (undated) DEVICE — DRP ADAPT ALLY UTER POSTN SYS 1P/U

## (undated) DEVICE — Device

## (undated) DEVICE — ENDOPATH 10MM ENDOSCOPIC BLUNT CHERRY DISSECTORS (12 POUCHES CONTAINING 3 DISSECTORS EACH): Brand: ENDOPATH

## (undated) DEVICE — SUT PROLN 0/0 CTX 30IN 8454H

## (undated) DEVICE — SAFESECURE,SECUREMENT,FOLEY CATH,STERILE: Brand: MEDLINE

## (undated) DEVICE — SUT MNCRYL PLS ANTIB UD 4/0 PS2 18IN

## (undated) DEVICE — SUT VIC 2/0 CT2 27IN J269H

## (undated) DEVICE — GLV SURG SENSICARE W/ALOE PF LF 8.5 STRL

## (undated) DEVICE — ENDOCUT SCISSOR TIP, DISPOSABLE: Brand: RENEW

## (undated) DEVICE — SUT GORE THX26 CV2 36IN 2N05A

## (undated) DEVICE — GLV SURG SIGNATURE TOUCH PF LTX 7 STRL

## (undated) DEVICE — PROXIMATE SKIN STAPLERS (35 WIDE) CONTAINS 35 STAINLESS STEEL STAPLES (FIXED HEAD): Brand: PROXIMATE

## (undated) DEVICE — BLANKT WARM UPPR/BDY ARM/OUT 57X196CM

## (undated) DEVICE — SUT SILK 3/0 SH CR8 18IN C013D

## (undated) DEVICE — ADHS SKIN PREMIERPRO EXOFIN TOPICAL HI/VISC .5ML

## (undated) DEVICE — TOTAL TRAY, 16FR 10ML SIL FOLEY, URN: Brand: MEDLINE

## (undated) DEVICE — SLV SCD CALF HEMOFORCE DVT THERP REPROC MD

## (undated) DEVICE — TOWEL,OR,DSP,ST,WHITE,DLX,XR,2/PK,40PK/C: Brand: MEDLINE

## (undated) DEVICE — TIP COVER ACCESSORY

## (undated) DEVICE — DRSNG WND GZ PAD BORDERED LF 4X5IN STRL

## (undated) DEVICE — BLADELESS OBTURATOR: Brand: WECK VISTA

## (undated) DEVICE — DRSNG TELFA PAD NONADH STR 1S 3X8IN

## (undated) DEVICE — ARM DRAPE

## (undated) DEVICE — ELECTRD BLD EZ CLN STD 6.5IN

## (undated) DEVICE — GOWN,NON-REINFORCED,SIRUS,SET IN SLV,XL: Brand: MEDLINE

## (undated) DEVICE — SPNG LAP 18X18IN LF STRL PK/5

## (undated) DEVICE — PENCL ROCKRSWCH MEGADYNE W/HOLSTR 10FT SS

## (undated) DEVICE — CYSTO/BLADDER IRRIGATION SET, REGULATING CLAMP

## (undated) DEVICE — DRP SURG UNIV BASIC BILAMINATE 70X100IN DISP

## (undated) DEVICE — TOWEL,OR,DSP,ST,BLUE,STD,4/PK,20PK/CS: Brand: MEDLINE

## (undated) DEVICE — PATIENT RETURN ELECTRODE, SINGLE-USE, CONTACT QUALITY MONITORING, ADULT, WITH 9FT CORD, FOR PATIENTS WEIGING OVER 33LBS. (15KG): Brand: MEGADYNE

## (undated) DEVICE — CVR HNDL LIGHT RIGID

## (undated) DEVICE — PAD,ABDOMINAL,5"X9",STERILE,LF,1/PK: Brand: MEDLINE INDUSTRIES, INC.